# Patient Record
Sex: FEMALE | Race: WHITE | NOT HISPANIC OR LATINO | Employment: OTHER | URBAN - METROPOLITAN AREA
[De-identification: names, ages, dates, MRNs, and addresses within clinical notes are randomized per-mention and may not be internally consistent; named-entity substitution may affect disease eponyms.]

---

## 2017-01-12 ENCOUNTER — TELEPHONE (OUTPATIENT)
Dept: ENDOCRINOLOGY | Facility: CLINIC | Age: 72
End: 2017-01-12

## 2017-01-12 NOTE — TELEPHONE ENCOUNTER
Per Dr. Nelson, the patient was instructed to take both sets on steroids since she has AI and is ill.

## 2017-01-12 NOTE — TELEPHONE ENCOUNTER
----- Message from Lonny Willis sent at 1/11/2017  3:03 PM CST -----  Contact: Patient  Patient called regarding predniSONE (DELTASONE) 5 MG tablet- Wants to know if she should be taking both the Prednisone while also taking methylPREDNISolone (MEDROL DOSEPACK) 4 mg tablet - Please reach her to advise at [phone]

## 2017-02-08 ENCOUNTER — LAB VISIT (OUTPATIENT)
Dept: LAB | Facility: HOSPITAL | Age: 72
End: 2017-02-08
Attending: INTERNAL MEDICINE
Payer: MEDICARE

## 2017-02-08 DIAGNOSIS — R68.89 ABNORMAL ENDOCRINE LABORATORY TEST FINDING: ICD-10-CM

## 2017-02-08 LAB — CORTIS SERPL-MCNC: <1 UG/DL

## 2017-02-08 PROCEDURE — 82533 TOTAL CORTISOL: CPT

## 2017-02-08 PROCEDURE — 82024 ASSAY OF ACTH: CPT

## 2017-02-08 PROCEDURE — 80053 COMPREHEN METABOLIC PANEL: CPT

## 2017-02-08 PROCEDURE — 36415 COLL VENOUS BLD VENIPUNCTURE: CPT | Mod: PO

## 2017-02-09 LAB
ALBUMIN SERPL BCP-MCNC: 3.5 G/DL
ALP SERPL-CCNC: 48 U/L
ALT SERPL W/O P-5'-P-CCNC: 17 U/L
ANION GAP SERPL CALC-SCNC: 5 MMOL/L
AST SERPL-CCNC: 21 U/L
BILIRUB SERPL-MCNC: 0.5 MG/DL
BUN SERPL-MCNC: 17 MG/DL
CALCIUM SERPL-MCNC: 9.2 MG/DL
CHLORIDE SERPL-SCNC: 105 MMOL/L
CO2 SERPL-SCNC: 28 MMOL/L
CREAT SERPL-MCNC: 1 MG/DL
EST. GFR  (AFRICAN AMERICAN): >60 ML/MIN/1.73 M^2
EST. GFR  (NON AFRICAN AMERICAN): 57.2 ML/MIN/1.73 M^2
GLUCOSE SERPL-MCNC: 101 MG/DL
POTASSIUM SERPL-SCNC: 4 MMOL/L
PROT SERPL-MCNC: 7 G/DL
SODIUM SERPL-SCNC: 138 MMOL/L

## 2017-02-10 LAB — ACTH PLAS-MCNC: 11 PG/ML

## 2017-02-13 ENCOUNTER — OFFICE VISIT (OUTPATIENT)
Dept: ENDOCRINOLOGY | Facility: CLINIC | Age: 72
End: 2017-02-13
Payer: MEDICARE

## 2017-02-13 VITALS
HEIGHT: 65 IN | WEIGHT: 124.56 LBS | HEART RATE: 84 BPM | BODY MASS INDEX: 20.75 KG/M2 | DIASTOLIC BLOOD PRESSURE: 72 MMHG | SYSTOLIC BLOOD PRESSURE: 132 MMHG

## 2017-02-13 DIAGNOSIS — E04.2 MULTINODULAR GOITER: ICD-10-CM

## 2017-02-13 DIAGNOSIS — E27.40 ADRENAL INSUFFICIENCY: Primary | ICD-10-CM

## 2017-02-13 PROCEDURE — 99999 PR PBB SHADOW E&M-EST. PATIENT-LVL IV: CPT | Mod: PBBFAC,,, | Performed by: INTERNAL MEDICINE

## 2017-02-13 PROCEDURE — 1126F AMNT PAIN NOTED NONE PRSNT: CPT | Mod: S$GLB,,, | Performed by: INTERNAL MEDICINE

## 2017-02-13 PROCEDURE — 1159F MED LIST DOCD IN RCRD: CPT | Mod: S$GLB,,, | Performed by: INTERNAL MEDICINE

## 2017-02-13 PROCEDURE — 1160F RVW MEDS BY RX/DR IN RCRD: CPT | Mod: S$GLB,,, | Performed by: INTERNAL MEDICINE

## 2017-02-13 PROCEDURE — 99214 OFFICE O/P EST MOD 30 MIN: CPT | Mod: S$GLB,,, | Performed by: INTERNAL MEDICINE

## 2017-02-13 PROCEDURE — 3075F SYST BP GE 130 - 139MM HG: CPT | Mod: S$GLB,,, | Performed by: INTERNAL MEDICINE

## 2017-02-13 PROCEDURE — 1157F ADVNC CARE PLAN IN RCRD: CPT | Mod: S$GLB,,, | Performed by: INTERNAL MEDICINE

## 2017-02-13 PROCEDURE — 3078F DIAST BP <80 MM HG: CPT | Mod: S$GLB,,, | Performed by: INTERNAL MEDICINE

## 2017-02-13 NOTE — MR AVS SNAPSHOT
[facility]  [address] [facility] [address]  [place] [address]  Phone: [phone]  Fax: [phone]                  Telma Fraser   2017 4:00 PM   Office Visit    Description:  Female : 1945   Provider:  Jean Nelson DO   Department:  [place] - Endocrinology           Diagnoses this Visit        Comments    Adrenal insufficiency    -  Primary     Multinodular goiter                To Do List           Future Appointments        Provider Department Dept Phone    2017 10:00 AM LAB, [place] [facility] [phone]    2017 10:15 AM [facility] [phone]    2017 2:30 PM Jean Nelson DO [facility] [phone]      Goals (5 Years of Data)     None      [facility]     [facility] Nurse Care [facility] -  Assistance  Registered nurses in the [facility] provide clinical advisement, health education, appointment booking, and other advisory services.  Call for this free service at [phone].             Medications           Message regarding Medications     Verify the changes and/or additions to your medication regime listed below are the same as discussed with your clinician today.  If any of these changes or additions are incorrect, please notify your healthcare provider.             Verify that the below list of medications is an accurate representation of the medications you are currently taking.  If none reported, the list may be blank. If incorrect, please contact your healthcare provider. Carry this list with you in case of emergency.           Current Medications     albuterol (PROVENTIL) 2.5 mg /3 mL (0.083 %) nebulizer solution Take 3 mLs (2.5 mg total) by nebulization every 6 (six) hours as needed for Wheezing.    albuterol 90 mcg/actuation inhaler Inhale 2 puffs into the lungs every 6 (six) hours as needed for Wheezing or Shortness of Breath.    alprazolam (XANAX) 0.5 MG  tablet     ammonium lactate (LAC-HYDRIN) 12 % lotion     apixaban (ELIQUIS) 5 mg Tab Take 5 mg by mouth 2 (two) times daily.    atorvastatin (LIPITOR) 10 MG tablet     B COMPLEX & C NO.20-FOLIC ACID ORAL Take by mouth.    BIOTIN ORAL Take by mouth.    cholecalciferol, vitamin D3, 2,000 unit Cap Take 1 capsule by mouth once daily. 1,000 units daily    clobetasol (CLOBEX) 0.05 % topical spray Apply topically 2 (two) times daily.    clotrimazole-betamethasone 1-0.05% (LOTRISONE) cream Apply topically 2 (two) times daily.    desloratadine (CLARINEX) 5 mg tablet Take 5 mg by mouth once daily.    fentaNYL (DURAGESIC) 50 mcg/hr Place 1 patch onto the skin every 72 hours.    hydrOXYzine HCl (ATARAX) 10 MG Tab Take 10 mg by mouth 3 (three) times daily as needed.    IMMUN GLOB G,IGG,/PRO/IGA 0-50 (PRIVIGEN IV) Inject 25 mg into the vein every 30 days.    ipratropium (ATROVENT) 0.03 % nasal spray 2 sprays by Nasal route 2 (two) times daily.    iron, carbonyl, (ICAR) 15 mg/1.25 mL Susp Take by mouth.    methylPREDNISolone (MEDROL DOSEPACK) 4 mg tablet use as directed    metoprolol succinate (TOPROL-XL) 25 MG 24 hr tablet Take 25 mg by mouth once daily.    montelukast (SINGULAIR) 10 mg tablet Take 10 mg by mouth every evening.    NITROSTAT 0.4 mg SL tablet     pantoprazole (PROTONIX) 40 MG tablet Take 40 mg by mouth 2 (two) times daily.     predniSONE (DELTASONE) 5 MG tablet 1.5 tablets (7.5 mg) once daily    primidone (MYSOLINE) 50 MG Tab 250 mg. pt takes 200 mg at night    promethazine (PHENERGAN) 25 MG tablet Take 1 tablet (25 mg total) by mouth every 6 (six) hours as needed for Nausea.    propafenone (RYTHMOL) 300 MG tablet Take 300 mg by mouth as needed.    salmeterol (SEREVENT) 50 mcg/dose diskus inhaler Inhale 1 puff into the lungs 2 (two) times daily.      sodium chloride 1 gram tablet Take 1 g by mouth 2 (two) times daily.     temazepam (RESTORIL) 30 mg capsule     tramadol (ULTRAM) 50 mg tablet     TREZIX .5-30  "mg Cap     zonisamide (ZONEGRAN) 100 MG Cap 200 mg every evening.            Clinical Reference Information           Your Vitals Were     BP Pulse Height Weight BMI    132/72 (BP Location: Left arm, Patient Position: Sitting, BP Method: Manual) 84 5' 5" (1.651 m) 56.5 kg (124 lb 9 oz) 20.73 kg/m2      Blood Pressure          Most Recent Value    BP  132/72      Allergies as of 2/13/2017     Fiorinal [Butalbital-aspirin-caffeine]    Gabapentin    Doxycycline    Evista [Raloxifene]    Lyrica [Pregabalin]      Immunizations Administered on Date of Encounter - 2/13/2017     None      Orders Placed During Today's Visit     Future Labs/Procedures Expected by Expires    ACTH  2/13/2017 2/13/2018    Comprehensive metabolic panel  2/13/2017 2/14/2018    TSH  2/13/2017 2/13/2018    US Soft Tissue Head Neck Thyroid  2/13/2017 2/13/2018      Language Assistance Services     ATTENTION: Language assistance services are available, free of charge. Please call [phone].      ATENCIÓN: Si habla abril, tiene a ruggiero disposición servicios gratuitos de asistencia lingüística. Llame al [phone].     CHÚ Ý: N?u b?n nói Ti?ng Vi?t, có các d?ch v? h? tr? ngôn ng? mi?n phí dành cho b?n. G?i s? [phone].         [facility] complies with applicable Federal civil rights laws and does not discriminate on the basis of race, color, national origin, age, disability, or sex.        "

## 2017-02-13 NOTE — PROGRESS NOTES
CHIEF COMPLAINT: Multinodular Goiter.   71 year old being seen as a f/u. She had a benign FNA 3/21/13. 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 She is seeing nephrology. Started on a trial of hydrocortisone for possible adrenal insufficiency (could not R/O based on cosyntropin stim test and having fatigue). Could not tolerate higher dose of steroids. On prednisone 7.5 currently. No vomiting. Not lightheaded when standing. No abd pain.         PAST MEDICAL HISTORY: Asthma, WPW, depression/anxiety, hyperlipidemia.     PAST SURGICAL HISTORY: right knee replacement. Hand surgery.     SOCIAL HISTORY: No T/A     FAMILY HISTORY: No known thyroid disease or thyroid cancer. Dementia.     MEDICATIONS/ALLERGIES: The patient's MedCard has been updated and reviewed.     ROS:   Constitutional: weight increased.   ENT: No dysphagia    Cardiovascular: No CP  Respiratory: SOB at baseline   Gastrointestinal: Seeing GI for nausea and difficulty swallowing.   GenitoUrinary - No dysuria   Skin: No new skin rash   Neurologic: No focal neurologic complaints   Remainder ROS negative         PE:   GENERAL: Well developed, well nourished.   PSYCH: appropriate mood and affect   EYES: PERRL, EOM intact.   ENT: Nares patent, oropharynx clear, mucosa pink,   NECK: Supple, trachea midline, Left nodule palpable. No LAD   CHEST: Resp even and unlabored, CTA bilateral.   CARDIAC: RRR, S1, S2 heard, no murmurs, rubs, S3, or S4     Results for DAVIDE VALDES (MRN [id number]) as of 2/13/2017 15:54   Ref. Range 2/8/2017 07:59   Sodium Latest Ref Range: 136 - 145 mmol/L 138   Potassium Latest Ref Range: 3.5 - 5.1 mmol/L 4.0   Chloride Latest Ref Range: 95 - 110 mmol/L 105   CO2 Latest Ref Range: 23 - 29 mmol/L 28   Anion Gap Latest Ref Range: 8 - 16 mmol/L 5 (L)   BUN, Bld Latest Ref Range: 8 - 23 mg/dL 17   Creatinine Latest Ref Range: 0.5 - 1.4 mg/dL 1.0   eGFR if non African American Latest Ref Range: >60 mL/min/1.73 m^2 57.2 (A)   eGFR if African  American Latest Ref Range: >60 mL/min/1.73 m^2 >60.0   Glucose Latest Ref Range: 70 - 110 mg/dL 101   Calcium Latest Ref Range: 8.7 - 10.5 mg/dL 9.2   Alkaline Phosphatase Latest Ref Range: 55 - 135 U/L 48 (L)   Total Protein Latest Ref Range: 6.0 - 8.4 g/dL 7.0   Albumin Latest Ref Range: 3.5 - 5.2 g/dL 3.5   Total Bilirubin Latest Ref Range: 0.1 - 1.0 mg/dL 0.5   AST Latest Ref Range: 10 - 40 U/L 21   ALT Latest Ref Range: 10 - 44 U/L 17         ASSESSMENT/PLAN:   1. Adrenal Insuffiency- Labs borderline for AI. Since having some symptoms that could be AI related decided to do a trial of steroids. Repeat testing could not rule out AI. Will continue steroid therapy.     2. Multinodular goiter-S/P benign FNA of left nodule.     3. SIADH- Na stable. Being followed by nephrology    FOLLOWUP  F/U 6 months with CMP, ACTH, Thyroid US

## 2017-02-20 ENCOUNTER — TELEPHONE (OUTPATIENT)
Dept: ENDOCRINOLOGY | Facility: CLINIC | Age: 72
End: 2017-02-20

## 2017-02-20 NOTE — TELEPHONE ENCOUNTER
The patient is having hand surgery anywhere between 3/7 and 3/21. She stated you need to send in a rx for steroids to cover her prior to surgery. Please advise

## 2017-03-02 ENCOUNTER — TELEPHONE (OUTPATIENT)
Dept: ENDOCRINOLOGY | Facility: CLINIC | Age: 72
End: 2017-03-02

## 2017-03-02 NOTE — TELEPHONE ENCOUNTER
The patient is having hand surgery under an anesthetic block. Per Dr. Nelson, she is to triple her oral dose of steroids the day before, the day of the surgery, and the day after the surgery.

## 2017-04-03 NOTE — TELEPHONE ENCOUNTER
----- Message from Nahun Jourdan sent at 4/3/2017 12:40 PM CDT -----  Contact: Patient  Patient states that she needs a refill for the medication PredniSONE (DELTASONE) 7 MG tablets the 90 day supply   Any questions, please call [phone] or cell [phone].  Thank you        [facility] - [place], [place] - [address]  [address]  [facility] [address]  Phone: [phone] Fax: [phone]

## 2017-04-05 RX ORDER — PREDNISONE 5 MG/1
TABLET ORAL
Qty: 135 TABLET | Refills: 3 | Status: SHIPPED | OUTPATIENT
Start: 2017-04-05 | End: 2017-07-28

## 2017-07-21 ENCOUNTER — HOSPITAL ENCOUNTER (OUTPATIENT)
Dept: RADIOLOGY | Facility: HOSPITAL | Age: 72
Discharge: HOME | End: 2017-07-21
Attending: INTERNAL MEDICINE
Payer: MEDICARE

## 2017-07-21 DIAGNOSIS — E27.40 ADRENAL INSUFFICIENCY: ICD-10-CM

## 2017-07-21 DIAGNOSIS — E04.2 MULTINODULAR GOITER: ICD-10-CM

## 2017-07-21 PROCEDURE — 76536 US EXAM OF HEAD AND NECK: CPT | Mod: 26,,, | Performed by: RADIOLOGY

## 2017-07-21 PROCEDURE — 76536 US EXAM OF HEAD AND NECK: CPT | Mod: TC,PO

## 2017-07-28 ENCOUNTER — OFFICE VISIT (OUTPATIENT)
Dept: ENDOCRINOLOGY | Facility: CLINIC | Age: 72
End: 2017-07-28
Payer: MEDICARE

## 2017-07-28 VITALS
WEIGHT: 129 LBS | DIASTOLIC BLOOD PRESSURE: 68 MMHG | BODY MASS INDEX: 21.49 KG/M2 | HEART RATE: 65 BPM | SYSTOLIC BLOOD PRESSURE: 104 MMHG | HEIGHT: 65 IN

## 2017-07-28 DIAGNOSIS — E22.2 SIADH (SYNDROME OF INAPPROPRIATE ADH PRODUCTION): ICD-10-CM

## 2017-07-28 DIAGNOSIS — E27.40 ADRENAL INSUFFICIENCY: Primary | ICD-10-CM

## 2017-07-28 DIAGNOSIS — E04.2 MULTINODULAR GOITER: ICD-10-CM

## 2017-07-28 PROCEDURE — 99999 PR PBB SHADOW E&M-EST. PATIENT-LVL II: CPT | Mod: PBBFAC,,, | Performed by: INTERNAL MEDICINE

## 2017-07-28 PROCEDURE — 99214 OFFICE O/P EST MOD 30 MIN: CPT | Mod: S$GLB,,, | Performed by: INTERNAL MEDICINE

## 2017-07-28 PROCEDURE — 1159F MED LIST DOCD IN RCRD: CPT | Mod: S$GLB,,, | Performed by: INTERNAL MEDICINE

## 2017-07-28 RX ORDER — PREDNISONE 5 MG/1
5 TABLET ORAL DAILY
Qty: 30 TABLET | Refills: 3
Start: 2017-07-28 | End: 2017-08-07

## 2017-07-28 RX ORDER — VIT C/E/ZN/COPPR/LUTEIN/ZEAXAN 250MG-90MG
1000 CAPSULE ORAL DAILY
COMMUNITY

## 2017-07-28 RX ORDER — PROPAFENONE HYDROCHLORIDE 150 MG/1
TABLET, COATED ORAL 2 TIMES DAILY
COMMUNITY
Start: 2017-07-20 | End: 2022-11-10 | Stop reason: CLARIF

## 2017-07-28 NOTE — PROGRESS NOTES
CHIEF COMPLAINT: Multinodular Goiter.   71 year old being seen as a f/u. She had a benign FNA 3/21/13. 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 She is seeing nephrology. Started on a trial of hydrocortisone for possible adrenal insufficiency (could not R/O based on cosyntropin stim test and having fatigue). Could not tolerate higher dose of steroids. On prednisone 7.5 currently. No N/V. Recently admitted for afib. Not lightheaded when standing. Would like to decrease to prednisone 5 mg.         PAST MEDICAL HISTORY: Asthma, WPW, depression/anxiety, hyperlipidemia.     PAST SURGICAL HISTORY: right knee replacement. Hand surgery.     SOCIAL HISTORY: No T/A     FAMILY HISTORY: No known thyroid disease or thyroid cancer. Dementia.     MEDICATIONS/ALLERGIES: The patient's MedCard has been updated and reviewed.     ROS:   Constitutional: weight increased.   ENT: No dysphagia    Cardiovascular: No CP  Respiratory: SOB at baseline   Gastrointestinal: Seeing GI for nausea and difficulty swallowing.   GenitoUrinary - No dysuria   Skin: No new skin rash   Neurologic: No focal neurologic complaints   Remainder ROS negative         PE:   GENERAL: Well developed, well nourished.   PSYCH: appropriate mood and affect   EYES: PERRL, EOM intact.   ENT: Nares patent, oropharynx clear, mucosa pink,   NECK: Supple, trachea midline, Left nodule palpable. No LAD   CHEST: Resp even and unlabored, CTA bilateral.   CARDIAC: RRR, S1, S2 heard, no murmurs, rubs, S3, or S4       Results for DAVIDE VALDES (MRN [id number]) as of 7/28/2017 14:23   Ref. Range 7/21/2017 09:45   Sodium Latest Ref Range: 136 - 145 mmol/L 138   Potassium Latest Ref Range: 3.5 - 5.1 mmol/L 3.7   Chloride Latest Ref Range: 95 - 110 mmol/L 108   CO2 Latest Ref Range: 23 - 29 mmol/L 22 (L)   Anion Gap Latest Ref Range: 8 - 16 mmol/L 8   BUN, Bld Latest Ref Range: 8 - 23 mg/dL 17   Creatinine Latest Ref Range: 0.5 - 1.4 mg/dL 1.1   eGFR if non  Latest  Ref Range: >60 mL/min/1.73 m^2 51.0 (A)   eGFR if African American Latest Ref Range: >60 mL/min/1.73 m^2 58.8 (A)   Glucose Latest Ref Range: 70 - 110 mg/dL 113 (H)   Calcium Latest Ref Range: 8.7 - 10.5 mg/dL 8.7   Alkaline Phosphatase Latest Ref Range: 55 - 135 U/L 48 (L)   Total Protein Latest Ref Range: 6.0 - 8.4 g/dL 6.9   Albumin Latest Ref Range: 3.5 - 5.2 g/dL 3.3 (L)   Total Bilirubin Latest Ref Range: 0.1 - 1.0 mg/dL 0.3   AST Latest Ref Range: 10 - 40 U/L 30   ALT Latest Ref Range: 10 - 44 U/L 21   ACTH Latest Ref Range: 0 - 46 pg/mL <10   TSH Latest Ref Range: 0.400 - 4.000 uIU/mL 2.332       THYROID US:  Echo thyroidwith comparison to 5//16    The right lobe of thyroid gland measures 3.9 x 1.2 x 1.5 cm in size.  The left lobe the thyroid gland measures 4.2 x 1.6 x 1.5cm in size.  This gives an approximate volume of on the right of 3.7cc and an approximate volume on the left of 4.5 cc for a total approximate volume of 9.1 cc.    Multinodular goiter is noted    A solid isthmus nodule is noted on the right of 1.2 cm without obvious microcalcifications without significant detrimental change since the prior when measured in a similar manner     A cluster of nodules is noted at the right lower pole the thyroid gland also unchanged. A hyperechoic 7 mm nodule is noted unchanged    Within the left lobe of the thyroid gland several larger nodules are noted the largest demonstrating coarse calcification internally of 1.6 cm predominantly solid with a possible capsule unchanged since the prior    In the lower pole left lobe of the thyroid gland a nodule is noted of 8 mm that is hypoechoic possibly with microcalcifications unchanged. More superiorly 1.1 cm nodule is noted also unchanged predominantly solid.   Impression         1.Multinodular goiter without convincing detrimental change since 5/4/16            ASSESSMENT/PLAN:   1. Adrenal Insuffiency- Labs borderline for AI. Since having some symptoms that could be  AI related decided to do a trial of steroids. Repeat testing could not rule out AI and symptoms improved. Prednisone 5 mg.     2. Multinodular goiter-S/P benign FNA of left nodule. US stable. Continue current tx.     3. SIADH- Na stable. Being followed by nephrology    FOLLOWUP  F/U 6 months with CMP, ACTH,

## 2017-11-28 ENCOUNTER — TELEPHONE (OUTPATIENT)
Dept: HEMATOLOGY/ONCOLOGY | Facility: CLINIC | Age: 72
End: 2017-11-28

## 2017-12-13 ENCOUNTER — OFFICE VISIT (OUTPATIENT)
Dept: HEMATOLOGY/ONCOLOGY | Facility: CLINIC | Age: 72
End: 2017-12-13
Payer: MEDICARE

## 2017-12-13 VITALS
HEIGHT: 65 IN | TEMPERATURE: 98 F | WEIGHT: 133.31 LBS | SYSTOLIC BLOOD PRESSURE: 117 MMHG | DIASTOLIC BLOOD PRESSURE: 84 MMHG | HEART RATE: 59 BPM | BODY MASS INDEX: 22.21 KG/M2 | RESPIRATION RATE: 18 BRPM

## 2017-12-13 DIAGNOSIS — Z87.19 H/O: GI BLEED: ICD-10-CM

## 2017-12-13 DIAGNOSIS — D63.8 ANEMIA, CHRONIC DISEASE: ICD-10-CM

## 2017-12-13 DIAGNOSIS — D63.1 ANEMIA OF CHRONIC RENAL FAILURE, UNSPECIFIED CKD STAGE: ICD-10-CM

## 2017-12-13 DIAGNOSIS — E55.9 VITAMIN D DEFICIENCY: ICD-10-CM

## 2017-12-13 DIAGNOSIS — D50.0 ANEMIA DUE TO CHRONIC BLOOD LOSS: ICD-10-CM

## 2017-12-13 DIAGNOSIS — N18.9 ANEMIA OF CHRONIC RENAL FAILURE, UNSPECIFIED CKD STAGE: ICD-10-CM

## 2017-12-13 DIAGNOSIS — D50.9 IRON DEFICIENCY ANEMIA, UNSPECIFIED IRON DEFICIENCY ANEMIA TYPE: Primary | ICD-10-CM

## 2017-12-13 PROCEDURE — 99213 OFFICE O/P EST LOW 20 MIN: CPT | Mod: ,,, | Performed by: INTERNAL MEDICINE

## 2017-12-13 RX ORDER — METOCLOPRAMIDE 10 MG/1
TABLET ORAL
COMMUNITY
Start: 2017-09-19 | End: 2018-09-29

## 2017-12-13 RX ORDER — AZELASTINE HYDROCHLORIDE 0.5 MG/ML
SOLUTION/ DROPS OPHTHALMIC
COMMUNITY
Start: 2017-11-02 | End: 2018-08-27 | Stop reason: ALTCHOICE

## 2017-12-13 RX ORDER — NEOMYCIN SULFATE, POLYMYXIN B SULFATE AND DEXAMETHASONE 3.5; 10000; 1 MG/ML; [USP'U]/ML; MG/ML
SUSPENSION/ DROPS OPHTHALMIC
COMMUNITY
Start: 2017-10-20 | End: 2018-08-27 | Stop reason: ALTCHOICE

## 2017-12-13 RX ORDER — KETOROLAC TROMETHAMINE 5 MG/ML
SOLUTION OPHTHALMIC
COMMUNITY
Start: 2017-11-29 | End: 2018-08-27 | Stop reason: ALTCHOICE

## 2017-12-13 RX ORDER — PREDNISONE 5 MG/1
7.5 TABLET ORAL DAILY
COMMUNITY
Start: 2017-11-15 | End: 2018-01-17 | Stop reason: SDUPTHER

## 2017-12-13 RX ORDER — CIPROFLOXACIN 500 MG/1
TABLET ORAL
COMMUNITY
Start: 2017-10-20 | End: 2018-02-26

## 2017-12-13 NOTE — LETTER
December 13, 2017      ANNIE Kelley Jr., MD  [address]  [address]  [facility] [address]           [facility]  [address]  [address]  [facility] [address]  Phone: [phone]  Fax: [phone]          Patient: Telma Fraser   MR Number: [id number]   YOB: 1945   Date of Visit: 12/13/2017       Dear Dr. ANNIE Kelley Jr.:    Thank you for referring Telma Fraser to me for evaluation. Attached you will find relevant portions of my assessment and plan of care.    If you have questions, please do not hesitate to call me. I look forward to following Telma Fraser along with you.    Sincerely,    Vince Cha MD    Enclosure  CC:  No Recipients    If you would like to receive this communication electronically, please contact [email or web address] or [phone] to request more information on [employer] Link access.    For providers and/or their staff who would like to refer a patient to [facility], please contact us through our one-stop-shop provider referral line, [facility], at [phone].    If you feel you have received this communication in error or would no longer like to receive these types of communications, please e-mail [email or web address]

## 2017-12-13 NOTE — PROGRESS NOTES
[facility]ematology/On[facility]  PROGRESS NOTE      Subjective:       Patient ID:   NAME: Telma Fraser : 1945     72 y.o. female    Referring Doc: Filemon Kelley  Other Physicians: Mitchell Emanuel, Maximino Maravilla    Chief Complaint:  Anemia f/u    History of Present Illness:     Patient returns today for a regularly scheduled follow-up visit.  The patient is doing ok with no new issues. She denies any CP, SOB, HA's or N/V.             ROS:   GEN: normal without any fever, night sweats or weight loss  HEENT: normal with no HA's, sore throat, stiff neck, changes in vision  CV: normal with no CP, SOB, PND, FLORES or orthopnea  PULM: normal with no SOB, cough, hemoptysis, sputum or pleuritic pain  GI: normal with no abdominal pain, nausea, vomiting, constipation, diarrhea, melanotic stools, BRBPR, or hematemesis  : normal with no hematuria, dysuria  BREAST: normal with no mass, discharge, pain  SKIN: normal with no rash, erythema, bruising, or swelling    Allergies:  Review of patient's allergies indicates:   Allergen Reactions    Fiorinal [butalbital-aspirin-caffeine] Hives    Gabapentin Other (See Comments)     shakes    Doxycycline Nausea And Vomiting     Tore up her stomach    Evista [raloxifene] Other (See Comments)     Shortness of breath    Lyrica [pregabalin] Other (See Comments)     Shortness of breath       Medications:    Current Outpatient Prescriptions:     acyclovir (ZOVIRAX) 800 MG Tab, Take 1 tablet (800 mg total) by mouth daily as needed., Disp: 90 tablet, Rfl: 3    albuterol (PROVENTIL) 2.5 mg /3 mL (0.083 %) nebulizer solution, Take 3 mLs (2.5 mg total) by nebulization every 6 (six) hours as needed for Wheezing., Disp: 120 each, Rfl: 1    albuterol 90 mcg/actuation inhaler, Inhale 2 puffs into the lungs every 6 (six) hours as needed for Wheezing or Shortness of Breath., Disp: 1 Inhaler, Rfl: 11    alprazolam (XANAX) 0.5 MG tablet, Take 0.5 mg by mouth 2 (two) times daily as needed. ,  Disp: , Rfl:     ammonium lactate (LAC-HYDRIN) 12 % lotion, Apply 1 application topically 2 (two) times daily as needed. , Disp: , Rfl:     apixaban (ELIQUIS) 5 mg Tab, Take 5 mg by mouth 2 (two) times daily., Disp: , Rfl:     atorvastatin (LIPITOR) 10 MG tablet, Take 10 mg by mouth once daily. , Disp: , Rfl:     azelastine (OPTIVAR) 0.05 % ophthalmic solution, , Disp: , Rfl:     BIOTIN ORAL, Take 1 tablet by mouth once daily. , Disp: , Rfl:     cholecalciferol, vitamin D3, (VITAMIN D3) 1,000 unit capsule, Take 1,000 Units by mouth once daily., Disp: , Rfl:     ciprofloxacin HCl (CIPRO) 500 MG tablet, , Disp: , Rfl:     clobetasol (CLOBEX) 0.05 % topical spray, Apply topically 2 (two) times daily., Disp: , Rfl:     clotrimazole-betamethasone 1-0.05% (LOTRISONE) cream, Apply topically 2 (two) times daily., Disp: , Rfl:     desloratadine (CLARINEX) 5 mg tablet, Take 5 mg by mouth once daily., Disp: , Rfl:     fentaNYL (DURAGESIC) 50 mcg/hr, Place 1 patch onto the skin every 72 hours., Disp: , Rfl:     FLOVENT  mcg/actuation inhaler, Inhale 2 puffs into the lungs once daily. , Disp: , Rfl:     hydrOXYzine HCl (ATARAX) 10 MG Tab, Take 10 mg by mouth 3 (three) times daily as needed., Disp: , Rfl:     IMMUN GLOB G,IGG,/PRO/IGA 0-50 (PRIVIGEN IV), Inject 25 mg into the vein every 30 days., Disp: , Rfl:     IRON FUM/VIT C/ASCORBATE SOD (IRON PLUS VITAMIN C ORAL), Take 1 tablet by mouth every other day., Disp: , Rfl:     ketorolac 0.5% (ACULAR) 0.5 % Drop, , Disp: , Rfl:     metoclopramide HCl (REGLAN) 10 MG tablet, , Disp: , Rfl:     metoprolol succinate (TOPROL-XL) 25 MG 24 hr tablet, Take 12.5 mg by mouth 2 (two) times daily. , Disp: , Rfl:     montelukast (SINGULAIR) 10 mg tablet, Take 10 mg by mouth every evening., Disp: , Rfl:     nebulizer accessories Misc, Nebulizer tubing, Disp: 1 each, Rfl: 0    neomycin-polymyxin-dexamethasone (MAXITROL) 3.5mg/mL-10,000 unit/mL-0.1 % DrpS, , Disp: ,  "Rfl:     NITROSTAT 0.4 mg SL tablet, Place 0.4 mg under the tongue every 5 (five) minutes as needed. , Disp: , Rfl:     pantoprazole (PROTONIX) 40 MG tablet, Take 40 mg by mouth 2 (two) times daily. , Disp: , Rfl:     predniSONE (DELTASONE) 5 MG tablet, , Disp: , Rfl:     primidone (MYSOLINE) 250 MG Tab, Take 250 mg by mouth nightly. , Disp: , Rfl:     promethazine (PHENERGAN) 25 MG tablet, Take 1 tablet (25 mg total) by mouth every 6 (six) hours as needed for Nausea., Disp: 10 tablet, Rfl: 0    propafenone (RHTHYMOL) 150 MG Tab, Take 225 mg by mouth 2 (two) times daily., Disp: , Rfl:     salmeterol (SEREVENT) 50 mcg/dose diskus inhaler, Inhale 1 puff into the lungs 2 (two) times daily.  , Disp: , Rfl:     sodium chloride 1 gram tablet, Take 1 g by mouth 2 (two) times daily. , Disp: , Rfl:     temazepam (RESTORIL) 30 mg capsule, Take 30 mg by mouth nightly as needed. , Disp: , Rfl:     tramadol (ULTRAM) 50 mg tablet, , Disp: , Rfl:     TREZIX .5-30 mg Cap, , Disp: , Rfl:     UNABLE TO FIND, Take 1 tablet by mouth once daily. Tri B Vitamin (B6-Folic Acid-B12), Disp: , Rfl:     zonisamide (ZONEGRAN) 100 MG Cap, 200 mg every evening. , Disp: , Rfl:     PMHx/PSHx Updates:  See patient's last visit with me on 5/10/17  See H&P on 9/10/2007        Pathology:  No matching staging information was found for the patient.          Objective:     Vitals:  Blood pressure 117/84, pulse (!) 59, temperature 98.3 °F (36.8 °C), resp. rate 18, height 5' 5" (1.651 m), weight 60.5 kg (133 lb 4.8 oz).    Physical Examination:   GEN: no apparent distress, comfortable; AAOx3  HEAD: atraumatic and normocephalic  EYES: no pallor, no icterus, PERRLA  ENT: OMM, no pharyngeal erythema, external ears WNL; no nasal discharge; no thrush  NECK: no masses, thyroid normal, trachea midline, no LAD/LN's, supple  CV: RRR with no murmur; normal pulse; normal S1 and S2; no pedal edema  CHEST: Normal respiratory effort; CTAB; normal " breath sounds; no wheeze or crackles  ABDOM: nontender and nondistended; soft; normal bowel sounds; no rebound/guarding  MUSC/Skeletal: ROM normal; no crepitus; joints normal; no deformities or arthropathy  EXTREM: no clubbing, cyanosis, inflammation or swelling  SKIN: no rashes, lesions, ulcers, petechiae or subcutaneous nodules  : no gilbert  NEURO: grossly intact; motor/sensory WNL; AAOx3; no tremors  PSYCH: normal mood, affect and behavior  LYMPH: normal cervical, supraclavicular, axillary and groin LN's            Labs:     12/1/2017  Lab Results   Component Value Date    WBC 6.91 12/01/2017    HGB 13.6 12/01/2017    HCT 42.2 12/01/2017     (H) 12/01/2017     12/01/2017     BMP  Lab Results   Component Value Date     12/01/2017    K 4.0 12/01/2017     12/01/2017    CO2 27 12/01/2017    BUN 14 12/01/2017    CREATININE 1.10 12/01/2017    CALCIUM 9.4 12/01/2017    ANIONGAP 8 12/01/2017    ESTGFRAFRICA 58 (A) 12/01/2017    EGFRNONAA 51 (A) 12/01/2017     Lab Results   Component Value Date    ALT 26 12/01/2017    AST 30 12/01/2017    ALKPHOS 66 12/01/2017    BILITOT 0.5 12/01/2017     Lab Results   Component Value Date    IRON 83 05/01/2017    TIBC 231 (L) 05/01/2017    FERRITIN 88 12/01/2017     Lab Results   Component Value Date    [id number] >1000 (H) 12/01/2017       Lab Results   Component Value Date    FOLATE >20.0 12/01/2017         Radiology/Diagnostic Studies:    No results found.    I have reviewed all available lab results and radiology reports.    Assessment/Plan:   (1) 72 y.o. female with diagnosis of chronic anemia with multifactorial etiology  - underlying chronic GI bleed due to gastric ulcers in past  - anemia of chronic disorders and chronic renal  - followed by Dr Medrano with GI  - latest hgb good and iron good    (2) atrial fib/WPW syndrome - followed by Dr Stanton/Kinjal with cardiology  - may need another ablation in future  - on eliquis    (3) Prior IV IgG with   aDina in past    (4) Prior TIA with underlying MTHFR-C heterozygosity  - followed by Dr Maravilla in past  - on folbic supplementation in past        PLAN:  1. F/u with PCP, Card, GI etc  2. Check repeat labs in 6 months  3. RTC in 6 months  Fax note to ANNIE Kelley Jr., MD, Maximino Medrano    Discussion:     I have explained all of the above in detail and the patient understands all of the current recommendation(s). I have answered all of their questions to the best of my ability and to their complete satisfaction.   The patient is to continue with the current management plan.            Electronically signed by Vince Cha MD

## 2017-12-21 ENCOUNTER — LAB VISIT (OUTPATIENT)
Dept: LAB | Facility: HOSPITAL | Age: 72
End: 2017-12-21
Attending: INTERNAL MEDICINE
Payer: MEDICARE

## 2017-12-21 DIAGNOSIS — E04.2 MULTINODULAR GOITER: ICD-10-CM

## 2017-12-21 DIAGNOSIS — E22.2 SIADH (SYNDROME OF INAPPROPRIATE ADH PRODUCTION): ICD-10-CM

## 2017-12-21 DIAGNOSIS — E27.40 ADRENAL INSUFFICIENCY: ICD-10-CM

## 2017-12-21 LAB
ALBUMIN SERPL BCP-MCNC: 3.4 G/DL
ALP SERPL-CCNC: 61 U/L
ALT SERPL W/O P-5'-P-CCNC: 14 U/L
ANION GAP SERPL CALC-SCNC: 6 MMOL/L
AST SERPL-CCNC: 26 U/L
BILIRUB SERPL-MCNC: 0.4 MG/DL
BUN SERPL-MCNC: 12 MG/DL
CALCIUM SERPL-MCNC: 9.3 MG/DL
CHLORIDE SERPL-SCNC: 105 MMOL/L
CO2 SERPL-SCNC: 28 MMOL/L
CREAT SERPL-MCNC: 1.1 MG/DL
EST. GFR  (AFRICAN AMERICAN): 58.4 ML/MIN/1.73 M^2
EST. GFR  (NON AFRICAN AMERICAN): 50.6 ML/MIN/1.73 M^2
GLUCOSE SERPL-MCNC: 87 MG/DL
POTASSIUM SERPL-SCNC: 4.8 MMOL/L
PROT SERPL-MCNC: 7.1 G/DL
SODIUM SERPL-SCNC: 139 MMOL/L

## 2017-12-21 PROCEDURE — 82024 ASSAY OF ACTH: CPT

## 2017-12-21 PROCEDURE — 36415 COLL VENOUS BLD VENIPUNCTURE: CPT | Mod: PO

## 2017-12-21 PROCEDURE — 80053 COMPREHEN METABOLIC PANEL: CPT

## 2017-12-22 LAB — ACTH PLAS-MCNC: <10 PG/ML

## 2018-01-03 ENCOUNTER — OFFICE VISIT (OUTPATIENT)
Dept: ENDOCRINOLOGY | Facility: CLINIC | Age: 73
End: 2018-01-03
Payer: MEDICARE

## 2018-01-03 VITALS
HEART RATE: 61 BPM | HEIGHT: 65 IN | DIASTOLIC BLOOD PRESSURE: 70 MMHG | BODY MASS INDEX: 22.24 KG/M2 | WEIGHT: 133.5 LBS | SYSTOLIC BLOOD PRESSURE: 112 MMHG

## 2018-01-03 DIAGNOSIS — E27.40 ADRENAL INSUFFICIENCY: Primary | ICD-10-CM

## 2018-01-03 DIAGNOSIS — E22.2 SIADH (SYNDROME OF INAPPROPRIATE ADH PRODUCTION): ICD-10-CM

## 2018-01-03 DIAGNOSIS — E04.2 MULTINODULAR GOITER: ICD-10-CM

## 2018-01-03 PROCEDURE — 99214 OFFICE O/P EST MOD 30 MIN: CPT | Mod: S$GLB,,, | Performed by: INTERNAL MEDICINE

## 2018-01-03 PROCEDURE — 99999 PR PBB SHADOW E&M-EST. PATIENT-LVL V: CPT | Mod: PBBFAC,,, | Performed by: INTERNAL MEDICINE

## 2018-01-03 NOTE — PROGRESS NOTES
CHIEF COMPLAINT: Multinodular Goiter.   72 year old being seen as a f/u. She had a benign FNA 3/21/13. 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 She is seeing nephrology. Started on a trial of hydrocortisone for possible adrenal insufficiency (could not R/O based on cosyntropin stim test and having fatigue). On prednisone 7.5 currently. Overall feeling well. No N/V          PAST MEDICAL HISTORY: Asthma, WPW, depression/anxiety, hyperlipidemia.     PAST SURGICAL HISTORY: right knee replacement. Hand surgery.     SOCIAL HISTORY: No T/A     FAMILY HISTORY: No known thyroid disease or thyroid cancer. Dementia.     MEDICATIONS/ALLERGIES: The patient's MedCard has been updated and reviewed.     ROS:   Constitutional: weight increased.   ENT: No dysphagia    Cardiovascular: No CP  Respiratory: SOB at baseline   Gastrointestinal: Seeing GI for nausea and difficulty swallowing.   GenitoUrinary - No dysuria   Skin: No new skin rash   Neurologic: No focal neurologic complaints   Remainder ROS negative         PE:   GENERAL: Well developed, well nourished.   PSYCH: appropriate mood and affect   EYES: PERRL, EOM intact.   ENT: Nares patent, oropharynx clear, mucosa pink,   NECK: Supple, trachea midline, Left nodule palpable. No LAD   CHEST: Resp even and unlabored, CTA bilateral.   CARDIAC: RRR, S1, S2 heard, no murmurs, rubs, S3, or S4     Results for DAVIDE VALDES (MRN [id number]) as of 1/3/2018 14:45   Ref. Range 12/21/2017 09:05   Sodium Latest Ref Range: 136 - 145 mmol/L 139   Potassium Latest Ref Range: 3.5 - 5.1 mmol/L 4.8   Chloride Latest Ref Range: 95 - 110 mmol/L 105   CO2 Latest Ref Range: 23 - 29 mmol/L 28   Anion Gap Latest Ref Range: 8 - 16 mmol/L 6 (L)   BUN, Bld Latest Ref Range: 8 - 23 mg/dL 12   Creatinine Latest Ref Range: 0.5 - 1.4 mg/dL 1.1   eGFR if non African American Latest Ref Range: >60 mL/min/1.73 m^2 50.6 (A)   eGFR if African American Latest Ref Range: >60 mL/min/1.73 m^2 58.4 (A)   Glucose  Latest Ref Range: 70 - 110 mg/dL 87   Calcium Latest Ref Range: 8.7 - 10.5 mg/dL 9.3   Alkaline Phosphatase Latest Ref Range: 55 - 135 U/L 61   Total Protein Latest Ref Range: 6.0 - 8.4 g/dL 7.1   Albumin Latest Ref Range: 3.5 - 5.2 g/dL 3.4 (L)   Total Bilirubin Latest Ref Range: 0.1 - 1.0 mg/dL 0.4   AST Latest Ref Range: 10 - 40 U/L 26   ALT Latest Ref Range: 10 - 44 U/L 14   ACTH Latest Ref Range: 0 - 46 pg/mL <10           ASSESSMENT/PLAN:   1. Adrenal Insuffiency- Labs borderline for AI. Since having some symptoms that could be AI related decided to do a trial of steroids. Repeat testing could not rule out AI and symptoms improved. Continue current dose of steroids.     2. Multinodular goiter-S/P benign FNA of left nodule. US stable. Continue current tx.     3. SIADH- Na stable. Being followed by nephrology    FOLLOWUP  F/U 1 year with CMP, ACTH, TSH, Thyroid US

## 2018-01-17 RX ORDER — PREDNISONE 5 MG/1
TABLET ORAL
Qty: 135 TABLET | Refills: 3 | Status: SHIPPED | OUTPATIENT
Start: 2018-01-17 | End: 2018-08-31 | Stop reason: DRUGHIGH

## 2018-06-11 ENCOUNTER — TELEPHONE (OUTPATIENT)
Dept: HEMATOLOGY/ONCOLOGY | Facility: CLINIC | Age: 73
End: 2018-06-11

## 2018-06-11 DIAGNOSIS — N18.9 ANEMIA OF CHRONIC RENAL FAILURE, UNSPECIFIED CKD STAGE: ICD-10-CM

## 2018-06-11 DIAGNOSIS — E55.9 VITAMIN D DEFICIENCY: ICD-10-CM

## 2018-06-11 DIAGNOSIS — D63.1 ANEMIA OF CHRONIC RENAL FAILURE, UNSPECIFIED CKD STAGE: ICD-10-CM

## 2018-06-11 DIAGNOSIS — D50.9 IRON DEFICIENCY ANEMIA, UNSPECIFIED IRON DEFICIENCY ANEMIA TYPE: Primary | ICD-10-CM

## 2018-07-22 PROBLEM — N39.0 RECURRENT UTI: Status: ACTIVE | Noted: 2018-07-22

## 2018-08-28 ENCOUNTER — TELEPHONE (OUTPATIENT)
Dept: HEMATOLOGY/ONCOLOGY | Facility: CLINIC | Age: 73
End: 2018-08-28

## 2018-09-17 ENCOUNTER — TELEPHONE (OUTPATIENT)
Dept: ENDOCRINOLOGY | Facility: CLINIC | Age: 73
End: 2018-09-17

## 2018-09-17 NOTE — TELEPHONE ENCOUNTER
----- Message from Daina Almanzar sent at 9/17/2018 12:37 PM CDT -----  Contact: pt  Pt calling states that she got a letter to call and make an appointment for the Dr in Jan,nothing is coming up in the system so the pt is needing to make this appointment,please...[phone] (home)

## 2018-09-29 PROBLEM — E87.1 HYPONATREMIA: Status: ACTIVE | Noted: 2018-09-29

## 2018-09-29 PROBLEM — S32.592A CLOSED FRACTURE OF LEFT INFERIOR PUBIC RAMUS: Status: ACTIVE | Noted: 2018-09-29

## 2018-09-29 PROBLEM — S32.82XA: Status: ACTIVE | Noted: 2018-09-29

## 2018-09-29 PROBLEM — I48.20 ATRIAL FIBRILLATION, CHRONIC: Status: ACTIVE | Noted: 2018-09-29

## 2018-09-29 PROBLEM — I95.9 HYPOTENSION: Status: ACTIVE | Noted: 2018-09-29

## 2018-10-01 PROBLEM — E27.2 ACUTE ADRENAL CRISIS: Status: ACTIVE | Noted: 2018-10-01

## 2018-10-02 PROBLEM — S32.82XA: Status: RESOLVED | Noted: 2018-09-29 | Resolved: 2018-10-02

## 2018-10-02 PROBLEM — E87.1 HYPONATREMIA: Status: RESOLVED | Noted: 2018-09-29 | Resolved: 2018-10-02

## 2018-10-02 PROBLEM — E27.2 ACUTE ADRENAL CRISIS: Status: RESOLVED | Noted: 2018-10-01 | Resolved: 2018-10-02

## 2018-10-02 PROBLEM — I48.20 ATRIAL FIBRILLATION, CHRONIC: Status: RESOLVED | Noted: 2018-09-29 | Resolved: 2018-10-02

## 2018-10-02 PROBLEM — I95.9 HYPOTENSION: Status: RESOLVED | Noted: 2018-09-29 | Resolved: 2018-10-02

## 2018-10-09 RX ORDER — PREDNISONE 5 MG/1
TABLET ORAL
Qty: 135 TABLET | Refills: 3 | Status: SHIPPED | OUTPATIENT
Start: 2018-10-09 | End: 2019-12-26

## 2018-12-18 PROBLEM — H53.2 DIPLOPIA: Status: ACTIVE | Noted: 2018-12-18

## 2018-12-19 PROBLEM — E44.1 MALNUTRITION OF MILD DEGREE: Status: ACTIVE | Noted: 2018-12-19

## 2019-01-09 PROBLEM — R11.0 NAUSEA: Status: ACTIVE | Noted: 2019-01-09

## 2019-02-08 ENCOUNTER — INITIAL CONSULT (OUTPATIENT)
Dept: OPHTHALMOLOGY | Facility: CLINIC | Age: 74
End: 2019-02-08
Payer: MEDICARE

## 2019-02-08 DIAGNOSIS — H53.2 DIPLOPIA: Primary | ICD-10-CM

## 2019-02-08 PROCEDURE — 92004 PR EYE EXAM, NEW PATIENT,COMPREHESV: ICD-10-PCS | Mod: S$GLB,,, | Performed by: OPHTHALMOLOGY

## 2019-02-08 PROCEDURE — 99999 PR PBB SHADOW E&M-EST. PATIENT-LVL II: CPT | Mod: PBBFAC,,, | Performed by: OPHTHALMOLOGY

## 2019-02-08 PROCEDURE — 99999 PR PBB SHADOW E&M-EST. PATIENT-LVL II: ICD-10-PCS | Mod: PBBFAC,,, | Performed by: OPHTHALMOLOGY

## 2019-02-08 PROCEDURE — 92004 COMPRE OPH EXAM NEW PT 1/>: CPT | Mod: S$GLB,,, | Performed by: OPHTHALMOLOGY

## 2019-02-08 RX ORDER — TRAZODONE HYDROCHLORIDE 100 MG/1
TABLET ORAL
COMMUNITY
End: 2019-04-02

## 2019-02-08 RX ORDER — PNV NO.95/FERROUS FUM/FOLIC AC 28MG-0.8MG
100 TABLET ORAL DAILY
COMMUNITY

## 2019-02-08 RX ORDER — CEPHALEXIN 250 MG
CAPSULE ORAL
COMMUNITY
End: 2019-04-02

## 2019-02-08 RX ORDER — BIOTIN 1000 MCG
1 TABLET,CHEWABLE ORAL DAILY
COMMUNITY

## 2019-02-08 NOTE — LETTER
[facility] - Ophthalmology  [address]  [facility] [address]  Phone: [phone]  Fax: [phone]   February 8, 2019    Wolfgang Goyal MD  [address]  [address]  [facility] [address]    Patient: Telma Fraser   MR Number: [id number]   YOB: 1945   Date of Visit: 2/8/2019       Dear Dr. Goyal:    Thank you for referring Telma Fraser to me for evaluation. Here is my assessment and plan of care:    Assessment:   /Plan     For exam results, see Encounter Report.    Diplopia      Ms. Fraser had transient binocular diplopia that resolved completely in about two days. She no longer has symptoms and I could not elicit any ocular misalignment. 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 No further evaluation is indicated. She will return to me as requested.          Plan:       For exam results, see Encounter Report.    Diplopia      Ms. Fraser had transient binocular diplopia that resolved completely in about two days. She no longer has symptoms and I could not elicit any ocular misalignment. 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 No further evaluation is indicated. She will return to me as requested.            Below you will find my full exam findings. If you have questions, please do not hesitate to call me. I look forward to following Ms. Telma Fraser along with you.    Sincerely,          Mark Lr MD       CC  Rome Maravilla MD             Base Eye Exam     Visual Acuity (Snellen - Linear)       Right Left    Dist cc 20/25 -2 20/25 -2    Correction:  Glasses          Pupils       Dark Light Shape React APD    Right 4 2 Round Brisk None    Left 4 2 Round Brisk None          Visual Fields (Counting fingers)       Right Left     Full Full          Extraocular Movement       Right Left     Full, Ortho Full, Ortho          Neuro/Psych      Oriented x3:  Yes    Mood/Affect:  Normal          Dilation     Both eyes:  1% Mydriacyl, 2.5% Phenylephrine @ 10:30 AM            Slit Lamp and Fundus Exam     External Exam       Right Left    External Normal Normal          Slit Lamp Exam       Right Left    Lids/Lashes Normal Normal    Conjunctiva/Sclera White and quiet White and quiet    Cornea Arcus Arcus     Anterior Chamber Deep and quiet Deep and quiet    Iris Round and reactive Round and reactive    Lens Posterior chamber intraocular lens Posterior chamber intraocular lens    Vitreous Normal Normal          Fundus Exam       Right Left    Disc Normal Normal    C/D Ratio 0.3 0.3    Macula Normal Normal    Vessels Normal Normal    Periphery Normal Normal

## 2019-02-08 NOTE — PROGRESS NOTES
HPI     Referred by Dr.Daniel ANNIE Goyal/Dr.Rex Maravilla(Neurologist)  Hospitalized in 12/2018 for diplopia which thought she was having a   stroke.  Patient states double have cleared up since the hospital stay.  Patient states her  wanted her to get a check since all this   happen.  OS little throbbing sometimes.    I have personally interviewed the patient, reviewed the history and   examined the patient and agree with the technician's &/or resident's exam,   assessment and plan.    Last edited by Mark Lr MD on 2/8/2019 10:30 AM. (History)            Assessment /Plan     For exam results, see Encounter Report.    Diplopia      Ms. Fraser had transient binocular diplopia that resolved completely in about two days. She no longer has symptoms and I could not elicit any ocular misalignment. 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 No further evaluation is indicated. She will return to me as requested.

## 2019-03-18 ENCOUNTER — HOSPITAL ENCOUNTER (OUTPATIENT)
Dept: RADIOLOGY | Facility: HOSPITAL | Age: 74
Discharge: HOME | End: 2019-03-18
Attending: INTERNAL MEDICINE
Payer: MEDICARE

## 2019-03-18 DIAGNOSIS — E27.40 ADRENAL INSUFFICIENCY: ICD-10-CM

## 2019-03-18 DIAGNOSIS — E04.2 MULTINODULAR GOITER: ICD-10-CM

## 2019-03-18 PROCEDURE — 76536 US SOFT TISSUE HEAD NECK THYROID: ICD-10-PCS | Mod: 26,,, | Performed by: RADIOLOGY

## 2019-03-18 PROCEDURE — 76536 US EXAM OF HEAD AND NECK: CPT | Mod: TC,PO

## 2019-03-18 PROCEDURE — 76536 US EXAM OF HEAD AND NECK: CPT | Mod: 26,,, | Performed by: RADIOLOGY

## 2019-04-02 ENCOUNTER — OFFICE VISIT (OUTPATIENT)
Dept: ENDOCRINOLOGY | Facility: CLINIC | Age: 74
End: 2019-04-02
Payer: MEDICARE

## 2019-04-02 VITALS
SYSTOLIC BLOOD PRESSURE: 82 MMHG | DIASTOLIC BLOOD PRESSURE: 50 MMHG | HEIGHT: 65 IN | WEIGHT: 101.44 LBS | BODY MASS INDEX: 16.9 KG/M2 | HEART RATE: 70 BPM

## 2019-04-02 DIAGNOSIS — E22.2 SIADH (SYNDROME OF INAPPROPRIATE ADH PRODUCTION): ICD-10-CM

## 2019-04-02 DIAGNOSIS — E04.2 MULTINODULAR GOITER: ICD-10-CM

## 2019-04-02 DIAGNOSIS — E27.40 ADRENAL INSUFFICIENCY: ICD-10-CM

## 2019-04-02 DIAGNOSIS — M81.0 OSTEOPOROSIS, UNSPECIFIED OSTEOPOROSIS TYPE, UNSPECIFIED PATHOLOGICAL FRACTURE PRESENCE: Primary | ICD-10-CM

## 2019-04-02 PROCEDURE — 99214 PR OFFICE/OUTPT VISIT, EST, LEVL IV, 30-39 MIN: ICD-10-PCS | Mod: S$GLB,,, | Performed by: INTERNAL MEDICINE

## 2019-04-02 PROCEDURE — 99999 PR PBB SHADOW E&M-EST. PATIENT-LVL III: CPT | Mod: PBBFAC,,, | Performed by: INTERNAL MEDICINE

## 2019-04-02 PROCEDURE — 99214 OFFICE O/P EST MOD 30 MIN: CPT | Mod: S$GLB,,, | Performed by: INTERNAL MEDICINE

## 2019-04-02 PROCEDURE — 3074F SYST BP LT 130 MM HG: CPT | Mod: CPTII,S$GLB,, | Performed by: INTERNAL MEDICINE

## 2019-04-02 PROCEDURE — 3078F DIAST BP <80 MM HG: CPT | Mod: CPTII,S$GLB,, | Performed by: INTERNAL MEDICINE

## 2019-04-02 PROCEDURE — 1101F PR PT FALLS ASSESS DOC 0-1 FALLS W/OUT INJ PAST YR: ICD-10-PCS | Mod: CPTII,S$GLB,, | Performed by: INTERNAL MEDICINE

## 2019-04-02 PROCEDURE — 3074F PR MOST RECENT SYSTOLIC BLOOD PRESSURE < 130 MM HG: ICD-10-PCS | Mod: CPTII,S$GLB,, | Performed by: INTERNAL MEDICINE

## 2019-04-02 PROCEDURE — 1101F PT FALLS ASSESS-DOCD LE1/YR: CPT | Mod: CPTII,S$GLB,, | Performed by: INTERNAL MEDICINE

## 2019-04-02 PROCEDURE — 99999 PR PBB SHADOW E&M-EST. PATIENT-LVL III: ICD-10-PCS | Mod: PBBFAC,,, | Performed by: INTERNAL MEDICINE

## 2019-04-02 PROCEDURE — 3078F PR MOST RECENT DIASTOLIC BLOOD PRESSURE < 80 MM HG: ICD-10-PCS | Mod: CPTII,S$GLB,, | Performed by: INTERNAL MEDICINE

## 2019-04-02 NOTE — PROGRESS NOTES
CHIEF COMPLAINT: Multinodular Goiter.   73 year old being seen as a f/u. She had a benign FNA 3/21/13. 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 She is seeing nephrology. Started on a trial of hydrocortisone for possible adrenal insufficiency (could not R/O based on cosyntropin stim test and having fatigue). On prednisone 7.5 currently. Taking Ca + D. Had a fracture of the pelvis Sept 2018 after a fall when walking to the bathroom. No dysphagia or GERD.             PAST MEDICAL HISTORY: Asthma, WPW, depression/anxiety, hyperlipidemia.     PAST SURGICAL HISTORY: right knee replacement. Hand surgery.     SOCIAL HISTORY: No T/A     FAMILY HISTORY: No known thyroid disease or thyroid cancer. Dementia.     MEDICATIONS/ALLERGIES: The patient's MedCard has been updated and reviewed.     ROS:   Constitutional: weight increased.   ENT: No dysphagia    Cardiovascular: No CP  Respiratory: SOB at baseline   Gastrointestinal: Seeing GI for nausea and difficulty swallowing.   GenitoUrinary - No dysuria   Skin: No new skin rash   Neurologic: No focal neurologic complaints   Remainder ROS negative         PE:   GENERAL: Well developed, well nourished.   NECK: Supple, trachea midline, Left nodule palpable. No LAD   CHEST: Resp even and unlabored, CTA bilateral.   CARDIAC: RRR, S1, S2 heard, no murmurs, rubs, S3, or S4       Results for KIMDOLLY MARIA A (MRN [id number]) as of 4/2/2019 13:19   Ref. Range 3/18/2019 10:30   Sodium Latest Ref Range: 136 - 145 mmol/L 137   Potassium Latest Ref Range: 3.5 - 5.1 mmol/L 3.3 (L)   Chloride Latest Ref Range: 95 - 110 mmol/L 104   CO2 Latest Ref Range: 23 - 29 mmol/L 27   Anion Gap Latest Ref Range: 8 - 16 mmol/L 6 (L)   BUN, Bld Latest Ref Range: 8 - 23 mg/dL 8   Creatinine Latest Ref Range: 0.5 - 1.4 mg/dL 0.9   eGFR if non African American Latest Ref Range: >60 mL/min/1.73 m^2 >60.0   eGFR if African American Latest Ref Range: >60 mL/min/1.73 m^2 >60.0   Glucose Latest Ref Range: 70 - 110 mg/dL  72   Calcium Latest Ref Range: 8.7 - 10.5 mg/dL 9.0   Alkaline Phosphatase Latest Ref Range: 55 - 135 U/L 45 (L)   Total Protein Latest Ref Range: 6.0 - 8.4 g/dL 6.4   Albumin Latest Ref Range: 3.5 - 5.2 g/dL 3.2 (L)   Total Bilirubin Latest Ref Range: 0.1 - 1.0 mg/dL 0.3   AST Latest Ref Range: 10 - 40 U/L 26   ALT Latest Ref Range: 10 - 44 U/L 13   ACTH Latest Ref Range: 0 - 46 pg/mL 8   TSH Latest Ref Range: 0.400 - 4.000 uIU/mL 2.586       US SOFT TISSUE HEAD NECK THYROID    CLINICAL HISTORY:  Unspecified adrenocortical insufficiency    TECHNIQUE:  Ultrasound of the thyroid and cervical lymph nodes was performed.    COMPARISON:  07/21/2017    FINDINGS:  The right lobe of thyroid gland measures 3.6 x 1.4 x 1.2 cm in size.  The left lobe the thyroid gland measures 4.0 x 1.5 x 1.4cm in size.  This gives an approximate volume of on the right of 3.2cc and an approximate volume on the left of 4.4 cc for a total approximate volume of 7.6 cc.    Multiple thyroid nodules are noted without worrisome detrimental change since 07/21/2017.  The largest on the right is near the isthmus of 1.3 x 1.0 x 0.7 cm unchanged given inter study variance since the prior.  This is predominantly solid, isoechoic, and without microcalcifications.    The largest nodule on the right is of 1.4 x 1.4 x 1.7 cm without worrisome detrimental change since 07/21/2017 that is predominantly solid with a coarse calcification centrally along with several possible microcalcifications.  This nodule appears unchanged since 07/21/2017 given inter study variance.  Several smaller nodules are noted that also appear  stable since the prior.  The 2nd largest is of 1.2 cm unchanged possibly with microcalcifications. The 2 largest on the left meet size criteria for fine-needle aspiration or biopsy but appear stable since the prior      Impression       1. Multiple thyroid nodules  without worrisome detrimental changes since 07/21/2017 noted.            ASSESSMENT/PLAN:   1. Adrenal Insuffiency- Labs borderline for AI. Since having some symptoms that could be AI related decided to do a trial of steroids. Repeat testing could not rule out AI and symptoms improved. Continue current dose of steroids since feeling better on steroids. Sick day precautions.     2. Multinodular goiter-S/P benign FNA of left nodule. US stable. Continue current tx.     3. SIADH- Na stable. Being followed by nephrology    4. Osteoporosis- has had a pelvis fracture. Start Prolia. Discussed s/e.     FOLLOWUP  Start Prolia- Unable to put in orders  Copy of labs and US.   F/U 6 months with CMP

## 2019-04-10 ENCOUNTER — INFUSION (OUTPATIENT)
Dept: INFUSION THERAPY | Facility: HOSPITAL | Age: 74
End: 2019-04-10
Attending: INTERNAL MEDICINE
Payer: MEDICARE

## 2019-04-10 VITALS
HEART RATE: 70 BPM | DIASTOLIC BLOOD PRESSURE: 52 MMHG | SYSTOLIC BLOOD PRESSURE: 95 MMHG | TEMPERATURE: 98 F | RESPIRATION RATE: 19 BRPM

## 2019-04-10 DIAGNOSIS — E55.9 VITAMIN D DEFICIENCY: ICD-10-CM

## 2019-04-10 DIAGNOSIS — S32.592A CLOSED FRACTURE OF LEFT INFERIOR PUBIC RAMUS, INITIAL ENCOUNTER: ICD-10-CM

## 2019-04-10 DIAGNOSIS — S32.82XD MULTIPLE CLOSED FRACTURES OF PELVIS WITH ROUTINE HEALING, SUBSEQUENT ENCOUNTER: Primary | ICD-10-CM

## 2019-04-10 PROCEDURE — 63600175 PHARM REV CODE 636 W HCPCS: Mod: JG,PN | Performed by: INTERNAL MEDICINE

## 2019-04-10 PROCEDURE — 96372 THER/PROPH/DIAG INJ SC/IM: CPT | Mod: PN

## 2019-04-10 RX ADMIN — DENOSUMAB 60 MG: 60 INJECTION SUBCUTANEOUS at 10:04

## 2019-05-07 ENCOUNTER — TELEPHONE (OUTPATIENT)
Dept: ENDOCRINOLOGY | Facility: CLINIC | Age: 74
End: 2019-05-07

## 2019-05-07 NOTE — TELEPHONE ENCOUNTER
----- Message from Nicolas Vides sent at 5/7/2019 11:10 AM CDT -----  Type:  Patient Call Back    Who Called:  pt  Does the patient know what this is regarding?: please mail results to the pt  Best Call Back Number: [phone]  Additional Information:  Please call pt and leave a detailed message if there is no answer.

## 2019-07-26 PROBLEM — M81.0 AGE-RELATED OSTEOPOROSIS WITHOUT CURRENT PATHOLOGICAL FRACTURE: Status: ACTIVE | Noted: 2019-07-26

## 2019-08-09 PROBLEM — E46 PROTEIN-CALORIE MALNUTRITION: Status: ACTIVE | Noted: 2019-08-09

## 2019-08-12 ENCOUNTER — TELEPHONE (OUTPATIENT)
Dept: ENDOCRINOLOGY | Facility: CLINIC | Age: 74
End: 2019-08-12

## 2019-08-12 NOTE — TELEPHONE ENCOUNTER
----- Message from Jena Nelson DO sent at 8/12/2019 12:42 PM CDT -----  Regarding: RE: continued weight loss  Thanks for the update. She is on a good amount of prednisone at 7.5 mg daily. For her weight, she would normally need 5 mg. Let me have my staff set up an appt with her to evaluate    My Staff: she needs appt to discuss weight loss. OK to use next available urgent spot    Thanks  Jean  ----- Message -----  From: ANNIE Kelley Jr., MD  Sent: 8/9/2019   4:18 PM  To: Jean Nelson DO  Subject: continued weight loss                            Good afternoon Jean,   I am reviewing this patient's chart. I saw her this week. Her continued weight loss is of concern. She is now below 100 lbs. Anorexic. Thought you might want to know of her status. I am open to any suggestions.   Thanks and have a good weekend,  [place]

## 2019-08-28 ENCOUNTER — OFFICE VISIT (OUTPATIENT)
Dept: ENDOCRINOLOGY | Facility: CLINIC | Age: 74
End: 2019-08-28
Payer: MEDICARE

## 2019-08-28 VITALS
HEART RATE: 59 BPM | HEIGHT: 65 IN | BODY MASS INDEX: 16.98 KG/M2 | DIASTOLIC BLOOD PRESSURE: 54 MMHG | SYSTOLIC BLOOD PRESSURE: 120 MMHG | WEIGHT: 101.94 LBS

## 2019-08-28 DIAGNOSIS — E04.2 MULTINODULAR GOITER: ICD-10-CM

## 2019-08-28 DIAGNOSIS — E22.2 SIADH (SYNDROME OF INAPPROPRIATE ADH PRODUCTION): ICD-10-CM

## 2019-08-28 DIAGNOSIS — M81.0 OSTEOPOROSIS, UNSPECIFIED OSTEOPOROSIS TYPE, UNSPECIFIED PATHOLOGICAL FRACTURE PRESENCE: ICD-10-CM

## 2019-08-28 DIAGNOSIS — R63.4 WEIGHT LOSS: ICD-10-CM

## 2019-08-28 DIAGNOSIS — E27.40 ADRENAL INSUFFICIENCY: Primary | ICD-10-CM

## 2019-08-28 PROCEDURE — 99214 PR OFFICE/OUTPT VISIT, EST, LEVL IV, 30-39 MIN: ICD-10-PCS | Mod: S$GLB,,, | Performed by: INTERNAL MEDICINE

## 2019-08-28 PROCEDURE — 99214 OFFICE O/P EST MOD 30 MIN: CPT | Mod: S$GLB,,, | Performed by: INTERNAL MEDICINE

## 2019-08-28 PROCEDURE — 3074F SYST BP LT 130 MM HG: CPT | Mod: CPTII,S$GLB,, | Performed by: INTERNAL MEDICINE

## 2019-08-28 PROCEDURE — 3078F PR MOST RECENT DIASTOLIC BLOOD PRESSURE < 80 MM HG: ICD-10-PCS | Mod: CPTII,S$GLB,, | Performed by: INTERNAL MEDICINE

## 2019-08-28 PROCEDURE — 1101F PT FALLS ASSESS-DOCD LE1/YR: CPT | Mod: CPTII,S$GLB,, | Performed by: INTERNAL MEDICINE

## 2019-08-28 PROCEDURE — 3078F DIAST BP <80 MM HG: CPT | Mod: CPTII,S$GLB,, | Performed by: INTERNAL MEDICINE

## 2019-08-28 PROCEDURE — 3074F PR MOST RECENT SYSTOLIC BLOOD PRESSURE < 130 MM HG: ICD-10-PCS | Mod: CPTII,S$GLB,, | Performed by: INTERNAL MEDICINE

## 2019-08-28 PROCEDURE — 1101F PR PT FALLS ASSESS DOC 0-1 FALLS W/OUT INJ PAST YR: ICD-10-PCS | Mod: CPTII,S$GLB,, | Performed by: INTERNAL MEDICINE

## 2019-08-28 PROCEDURE — 99999 PR PBB SHADOW E&M-EST. PATIENT-LVL III: ICD-10-PCS | Mod: PBBFAC,,, | Performed by: INTERNAL MEDICINE

## 2019-08-28 PROCEDURE — 99999 PR PBB SHADOW E&M-EST. PATIENT-LVL III: CPT | Mod: PBBFAC,,, | Performed by: INTERNAL MEDICINE

## 2019-08-28 NOTE — PROGRESS NOTES
CHIEF COMPLAINT: Multinodular Goiter.   73 year old being seen as a f/u. She had a benign FNA 3/21/13. 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 She is seeing nephrology. Started on a trial of hydrocortisone for possible adrenal insufficiency (could not R/O based on cosyntropin stim test and having fatigue). On prednisone 7.5 currently. Taking Ca + D. Had a fracture of the pelvis Sept 2018 after a fall when walking to the bathroom.   Had been losing weigh. Was 99.5 in 8/7/129. 101 today. Was 130 in early 2018. Had tried hydrocortisone in 2016 and she stopped due to palpitations and switched to prednisone. Has had some nausea but no vomiting. Not lightheaded when standing. Eats 3 meals a day. No snack. Does 1-2 Ensure a day. Every night has carrot cake.           PAST MEDICAL HISTORY: Asthma, WPW, depression/anxiety, hyperlipidemia.     PAST SURGICAL HISTORY: right knee replacement. Hand surgery.     SOCIAL HISTORY: No T/A     FAMILY HISTORY: No known thyroid disease or thyroid cancer. Dementia.     MEDICATIONS/ALLERGIES: The patient's MedCard has been updated and reviewed.     ROS:   Constitutional: weight increased.   ENT: No dysphagia    Cardiovascular: No CP  Respiratory: SOB at baseline   Gastrointestinal: Seeing GI for nausea and difficulty swallowing.   GenitoUrinary - No dysuria   Skin: No new skin rash   Neurologic: No focal neurologic complaints   Remainder ROS negative         PE:   GENERAL: Well developed, well nourished.   NECK: Supple, trachea midline, Left nodule palpable. No LAD   CHEST: Resp even and unlabored, CTA bilateral.   CARDIAC: RRR, S1, S2 heard, no murmurs, rubs, S3, or S4             ASSESSMENT/PLAN:   1. Adrenal Insuffiency- Labs borderline for AI. Since having some symptoms that could be AI related decided to do a trial of steroids. Repeat testing could not rule out AI and symptoms improved. Continue current dose of steroids since feeling better on steroids. Unclear why losing weight. On  the higher end of steroid dosing so may not be the cause of weight loss. Discussed trying hydrocortisone but had tried that in the past and states did not tolerate that well.     2. Multinodular goiter-S/P benign FNA of left nodule. US stable.     3. SIADH- . Being followed by nephrology. Check Na with next labs.     4. Osteoporosis- has had a pelvis fracture. Start Prolia. Discussed s/e.     5. Weight loss- see # 1. Will do w/u as seen below.     FOLLOWUP  TSH, FT4, ACTH, CMP next week- hold biotin

## 2019-09-05 ENCOUNTER — LAB VISIT (OUTPATIENT)
Dept: LAB | Facility: HOSPITAL | Age: 74
End: 2019-09-05
Attending: INTERNAL MEDICINE
Payer: MEDICARE

## 2019-09-05 DIAGNOSIS — M81.0 OSTEOPOROSIS, UNSPECIFIED OSTEOPOROSIS TYPE, UNSPECIFIED PATHOLOGICAL FRACTURE PRESENCE: ICD-10-CM

## 2019-09-05 DIAGNOSIS — E27.40 ADRENAL INSUFFICIENCY: ICD-10-CM

## 2019-09-05 DIAGNOSIS — E04.2 MULTINODULAR GOITER: ICD-10-CM

## 2019-09-05 DIAGNOSIS — E22.2 SIADH (SYNDROME OF INAPPROPRIATE ADH PRODUCTION): ICD-10-CM

## 2019-09-05 LAB
ALBUMIN SERPL BCP-MCNC: 3.4 G/DL (ref 3.5–5.2)
ALP SERPL-CCNC: 31 U/L (ref 55–135)
ALT SERPL W/O P-5'-P-CCNC: 14 U/L (ref 10–44)
ANION GAP SERPL CALC-SCNC: 10 MMOL/L (ref 8–16)
AST SERPL-CCNC: 25 U/L (ref 10–40)
BILIRUB SERPL-MCNC: 0.4 MG/DL (ref 0.1–1)
BUN SERPL-MCNC: 9 MG/DL (ref 8–23)
CALCIUM SERPL-MCNC: 8.1 MG/DL (ref 8.7–10.5)
CHLORIDE SERPL-SCNC: 103 MMOL/L (ref 95–110)
CO2 SERPL-SCNC: 22 MMOL/L (ref 23–29)
CREAT SERPL-MCNC: 1 MG/DL (ref 0.5–1.4)
EST. GFR  (AFRICAN AMERICAN): >60 ML/MIN/1.73 M^2
EST. GFR  (NON AFRICAN AMERICAN): 56.4 ML/MIN/1.73 M^2
GLUCOSE SERPL-MCNC: 66 MG/DL (ref 70–110)
POTASSIUM SERPL-SCNC: 3.3 MMOL/L (ref 3.5–5.1)
PROT SERPL-MCNC: 5.9 G/DL (ref 6–8.4)
SODIUM SERPL-SCNC: 135 MMOL/L (ref 136–145)
T4 FREE SERPL-MCNC: 0.84 NG/DL (ref 0.71–1.51)
TSH SERPL DL<=0.005 MIU/L-ACNC: 2.77 UIU/ML (ref 0.4–4)

## 2019-09-05 PROCEDURE — 84439 ASSAY OF FREE THYROXINE: CPT

## 2019-09-05 PROCEDURE — 36415 COLL VENOUS BLD VENIPUNCTURE: CPT | Mod: PO

## 2019-09-05 PROCEDURE — 82024 ASSAY OF ACTH: CPT

## 2019-09-05 PROCEDURE — 80053 COMPREHEN METABOLIC PANEL: CPT

## 2019-09-05 PROCEDURE — 84443 ASSAY THYROID STIM HORMONE: CPT

## 2019-09-06 LAB — ACTH PLAS-MCNC: 9 PG/ML (ref 0–46)

## 2019-09-10 ENCOUNTER — TELEPHONE (OUTPATIENT)
Dept: ENDOCRINOLOGY | Facility: CLINIC | Age: 74
End: 2019-09-10

## 2019-09-10 NOTE — TELEPHONE ENCOUNTER
----- Message from Jean Nelson DO sent at 9/10/2019 12:01 PM CDT -----  Let her know that her thyroid levels are normal  ----- Message -----  From: Jaye Luong LPN  Sent: 9/9/2019   4:20 PM  To: Jean Nelson DO    Please review labs and advise.   ----- Message -----  From: Brina Luke  Sent: 9/9/2019   4:14 PM  To: Omar Pena Staff (Endo)      Type:  Test Results    Who Called:  pt  Name of Test (Lab/Mammo/Etc):   Lab  wk  Best Call Back Number:  [phone]  Additional Information:    Calling  For test  results

## 2019-10-07 ENCOUNTER — TELEPHONE (OUTPATIENT)
Dept: ENDOCRINOLOGY | Facility: CLINIC | Age: 74
End: 2019-10-07

## 2019-10-07 DIAGNOSIS — M81.0 OSTEOPOROSIS, UNSPECIFIED OSTEOPOROSIS TYPE, UNSPECIFIED PATHOLOGICAL FRACTURE PRESENCE: Primary | ICD-10-CM

## 2019-10-07 NOTE — TELEPHONE ENCOUNTER
Her last ca was on the low end. Have her do a CMP, PTH, Phos, Vit D ionized ca. Would like to see serum Ca higher before getting Prolia

## 2019-10-07 NOTE — TELEPHONE ENCOUNTER
Spoke to pt and adv of Dr Nelson's previous message. Pt stated she will do labs tomorrow at [facility].

## 2019-10-10 ENCOUNTER — TELEPHONE (OUTPATIENT)
Dept: ENDOCRINOLOGY | Facility: CLINIC | Age: 74
End: 2019-10-10

## 2019-10-10 DIAGNOSIS — M81.0 OSTEOPOROSIS, UNSPECIFIED OSTEOPOROSIS TYPE, UNSPECIFIED PATHOLOGICAL FRACTURE PRESENCE: Primary | ICD-10-CM

## 2019-10-10 NOTE — TELEPHONE ENCOUNTER
S/w pt. Informed Dr. Nelson signed orders for Prolia today. Okay to get injection on Monday. Verbalized understanding.

## 2019-10-10 NOTE — TELEPHONE ENCOUNTER
----- Message from Jean Nelson DO sent at 10/10/2019  3:07 PM CDT -----  Contact: patient  Her corrected Ca is normal. Her ionized Ca is pending. But ok for her to get prolia.   ----- Message -----  From: Jaye Luong LPN  Sent: 10/10/2019   1:29 PM CDT  To: Jean Nelson DO    Pt is requesting results from 10/8/19.   ----- Message -----  From: Tisha Clayton  Sent: 10/10/2019  12:42 PM CDT  To: Omar Pena Staff (Endo)    Type: Needs Medical Advice    Who Called:  patient  Symptoms (please be specific):    How long has patient had these symptoms:    Pharmacy name and phone #:    Best Call Back Number: [phone]  Additional Information: requesting a call back regarding test results

## 2019-10-10 NOTE — TELEPHONE ENCOUNTER
----- Message from Corie Brock sent at 10/10/2019  2:30 PM CDT -----  Contact: patient   Type:  Patient Returning Call    Who Called: patient   Who Left Message for Patient:  n/a  Does the patient know what this is regarding?:  n/a  Best Call Back Number:  [phone] (home)

## 2019-10-14 ENCOUNTER — INFUSION (OUTPATIENT)
Dept: INFUSION THERAPY | Facility: HOSPITAL | Age: 74
End: 2019-10-14
Attending: INTERNAL MEDICINE
Payer: MEDICARE

## 2019-10-14 VITALS
HEART RATE: 64 BPM | HEIGHT: 65 IN | DIASTOLIC BLOOD PRESSURE: 48 MMHG | SYSTOLIC BLOOD PRESSURE: 85 MMHG | BODY MASS INDEX: 16.53 KG/M2 | RESPIRATION RATE: 18 BRPM | TEMPERATURE: 99 F | WEIGHT: 99.19 LBS

## 2019-10-14 DIAGNOSIS — M81.0 OSTEOPOROSIS, UNSPECIFIED OSTEOPOROSIS TYPE, UNSPECIFIED PATHOLOGICAL FRACTURE PRESENCE: Primary | ICD-10-CM

## 2019-10-14 DIAGNOSIS — S32.82XD MULTIPLE CLOSED FRACTURES OF PELVIS WITH ROUTINE HEALING, SUBSEQUENT ENCOUNTER: ICD-10-CM

## 2019-10-14 DIAGNOSIS — S32.592A CLOSED FRACTURE OF LEFT INFERIOR PUBIC RAMUS, INITIAL ENCOUNTER: ICD-10-CM

## 2019-10-14 DIAGNOSIS — E55.9 VITAMIN D DEFICIENCY: ICD-10-CM

## 2019-10-14 PROCEDURE — 96372 THER/PROPH/DIAG INJ SC/IM: CPT | Mod: PN

## 2019-10-14 PROCEDURE — 63600175 PHARM REV CODE 636 W HCPCS: Mod: JG,PN | Performed by: INTERNAL MEDICINE

## 2019-10-14 RX ADMIN — DENOSUMAB 60 MG: 60 INJECTION SUBCUTANEOUS at 10:10

## 2019-10-14 NOTE — PLAN OF CARE
Pt educated to continue calcium and vit D supplement. Pt also educated to refrain from invasive oral procedures for 3 months. Pt verbalized understanding

## 2019-10-14 NOTE — PLAN OF CARE
Pt tolerated tx well AVS given to pt Pt educated to call Dr with any issues. Pt verbalized understanding. Pt adequate for discharge.

## 2019-12-06 PROBLEM — I70.0 THORACIC AORTA ATHEROSCLEROSIS: Status: ACTIVE | Noted: 2019-12-06

## 2019-12-26 RX ORDER — PREDNISONE 5 MG/1
TABLET ORAL
Qty: 135 TABLET | Refills: 3 | Status: SHIPPED | OUTPATIENT
Start: 2019-12-26 | End: 2020-05-14

## 2020-04-09 ENCOUNTER — TELEPHONE (OUTPATIENT)
Dept: ENDOCRINOLOGY | Facility: CLINIC | Age: 75
End: 2020-04-09

## 2020-04-09 NOTE — TELEPHONE ENCOUNTER
"S/w pt. Unable to do virtual visit. Scheduled carter w/ Dr. Nelson and labeled as a "Telephone call visit". Verbalized understanding.   "

## 2020-04-09 NOTE — TELEPHONE ENCOUNTER
S/w pt. States she has been freezing constantly, cannot get warm. Dr. Kelley had her on Amoxacillin x10 days and then Cipro [id number]. Hasn't been on anything this week. States she always has the chills/is freezing, no fever. She wakes up soaking wet and sometimes has to change her shirt x3 during the night. Please advise.

## 2020-04-09 NOTE — TELEPHONE ENCOUNTER
----- Message from Salma Pena sent at 4/9/2020 12:55 PM CDT -----  Contact: pt  Type: Needs Medical Advice    Who Called:  Pt  Best Call Back Number: [phone]  Additional Information: Requesting a call back regarding pt stated she getting chills and wanted to speak with doctor   Please Advise ---Thank you

## 2020-04-13 ENCOUNTER — TELEPHONE (OUTPATIENT)
Dept: ENDOCRINOLOGY | Facility: CLINIC | Age: 75
End: 2020-04-13

## 2020-04-13 DIAGNOSIS — E27.40 ADRENAL INSUFFICIENCY: Primary | ICD-10-CM

## 2020-04-13 NOTE — TELEPHONE ENCOUNTER
Unable to do Virtual Visit so called patient- due to COVID  For past 5-6 weeks states she has been feeling cold.   At night she is sweating. States no fever. States started on abx. Also tried cipro.   No CP, SOB  No N/V    Check TSH, CBC, CMP prior to appt May 14- not fasting

## 2020-04-17 ENCOUNTER — TELEPHONE (OUTPATIENT)
Dept: ENDOCRINOLOGY | Facility: CLINIC | Age: 75
End: 2020-04-17

## 2020-04-17 NOTE — TELEPHONE ENCOUNTER
----- Message from Antonio Torres sent at 4/17/2020  2:03 PM CDT -----  Contact: pt  Pt called and would like to speak to the nurse about some blood work    Pt can be reached at [phone]

## 2020-04-17 NOTE — TELEPHONE ENCOUNTER
Pt requests the lab orders be faxed to [facility] at [phone] for her upcoming appt so that she does not have to come in for them.  Orders faxed.

## 2020-04-30 ENCOUNTER — DOCUMENT SCAN (OUTPATIENT)
Dept: HOME HEALTH SERVICES | Facility: HOSPITAL | Age: 75
End: 2020-04-30
Payer: MEDICARE

## 2020-05-01 ENCOUNTER — DOCUMENT SCAN (OUTPATIENT)
Dept: HOME HEALTH SERVICES | Facility: HOSPITAL | Age: 75
End: 2020-05-01
Payer: MEDICARE

## 2020-05-08 ENCOUNTER — LAB VISIT (OUTPATIENT)
Dept: INFUSION THERAPY | Facility: HOSPITAL | Age: 75
End: 2020-05-08
Attending: INTERNAL MEDICINE
Payer: MEDICARE

## 2020-05-08 DIAGNOSIS — Z03.818 ENCNTR FOR OBS FOR SUSP EXPSR TO OTH BIOLG AGENTS RULED OUT: Primary | ICD-10-CM

## 2020-05-08 PROCEDURE — U0002 COVID-19 LAB TEST NON-CDC: HCPCS

## 2020-05-09 LAB — SARS-COV-2 RNA RESP QL NAA+PROBE: NOT DETECTED

## 2020-05-11 ENCOUNTER — TELEPHONE (OUTPATIENT)
Dept: ENDOCRINOLOGY | Facility: CLINIC | Age: 75
End: 2020-05-11

## 2020-05-12 ENCOUNTER — INFUSION (OUTPATIENT)
Dept: INFUSION THERAPY | Facility: HOSPITAL | Age: 75
End: 2020-05-12
Attending: INTERNAL MEDICINE
Payer: MEDICARE

## 2020-05-12 VITALS
RESPIRATION RATE: 16 BRPM | SYSTOLIC BLOOD PRESSURE: 91 MMHG | DIASTOLIC BLOOD PRESSURE: 60 MMHG | HEART RATE: 76 BPM | TEMPERATURE: 99 F

## 2020-05-12 DIAGNOSIS — M81.0 OSTEOPOROSIS, UNSPECIFIED OSTEOPOROSIS TYPE, UNSPECIFIED PATHOLOGICAL FRACTURE PRESENCE: Primary | ICD-10-CM

## 2020-05-12 DIAGNOSIS — E55.9 VITAMIN D DEFICIENCY: ICD-10-CM

## 2020-05-12 DIAGNOSIS — S32.82XD MULTIPLE CLOSED FRACTURES OF PELVIS WITH ROUTINE HEALING, SUBSEQUENT ENCOUNTER: ICD-10-CM

## 2020-05-12 DIAGNOSIS — S32.592A CLOSED FRACTURE OF LEFT INFERIOR PUBIC RAMUS, INITIAL ENCOUNTER: ICD-10-CM

## 2020-05-12 PROCEDURE — 63600175 PHARM REV CODE 636 W HCPCS: Mod: JG,PN | Performed by: INTERNAL MEDICINE

## 2020-05-12 PROCEDURE — 96372 THER/PROPH/DIAG INJ SC/IM: CPT | Mod: PN

## 2020-05-12 RX ADMIN — DENOSUMAB 60 MG: 60 INJECTION SUBCUTANEOUS at 10:05

## 2020-05-14 ENCOUNTER — OFFICE VISIT (OUTPATIENT)
Dept: ENDOCRINOLOGY | Facility: CLINIC | Age: 75
End: 2020-05-14
Payer: MEDICARE

## 2020-05-14 VITALS
DIASTOLIC BLOOD PRESSURE: 60 MMHG | BODY MASS INDEX: 17.89 KG/M2 | SYSTOLIC BLOOD PRESSURE: 108 MMHG | HEART RATE: 70 BPM | WEIGHT: 101 LBS | HEIGHT: 63 IN | OXYGEN SATURATION: 99 %

## 2020-05-14 DIAGNOSIS — M81.0 OSTEOPOROSIS, UNSPECIFIED OSTEOPOROSIS TYPE, UNSPECIFIED PATHOLOGICAL FRACTURE PRESENCE: ICD-10-CM

## 2020-05-14 DIAGNOSIS — E04.2 MULTINODULAR GOITER: ICD-10-CM

## 2020-05-14 DIAGNOSIS — E27.40 ADRENAL INSUFFICIENCY: Primary | ICD-10-CM

## 2020-05-14 PROCEDURE — 99214 PR OFFICE/OUTPT VISIT, EST, LEVL IV, 30-39 MIN: ICD-10-PCS | Mod: S$GLB,,, | Performed by: INTERNAL MEDICINE

## 2020-05-14 PROCEDURE — 3074F PR MOST RECENT SYSTOLIC BLOOD PRESSURE < 130 MM HG: ICD-10-PCS | Mod: CPTII,S$GLB,, | Performed by: INTERNAL MEDICINE

## 2020-05-14 PROCEDURE — 99214 OFFICE O/P EST MOD 30 MIN: CPT | Mod: S$GLB,,, | Performed by: INTERNAL MEDICINE

## 2020-05-14 PROCEDURE — 3078F DIAST BP <80 MM HG: CPT | Mod: CPTII,S$GLB,, | Performed by: INTERNAL MEDICINE

## 2020-05-14 PROCEDURE — 99999 PR PBB SHADOW E&M-EST. PATIENT-LVL III: CPT | Mod: PBBFAC,,, | Performed by: INTERNAL MEDICINE

## 2020-05-14 PROCEDURE — 3074F SYST BP LT 130 MM HG: CPT | Mod: CPTII,S$GLB,, | Performed by: INTERNAL MEDICINE

## 2020-05-14 PROCEDURE — 1159F MED LIST DOCD IN RCRD: CPT | Mod: S$GLB,,, | Performed by: INTERNAL MEDICINE

## 2020-05-14 PROCEDURE — 3078F PR MOST RECENT DIASTOLIC BLOOD PRESSURE < 80 MM HG: ICD-10-PCS | Mod: CPTII,S$GLB,, | Performed by: INTERNAL MEDICINE

## 2020-05-14 PROCEDURE — 1101F PT FALLS ASSESS-DOCD LE1/YR: CPT | Mod: CPTII,S$GLB,, | Performed by: INTERNAL MEDICINE

## 2020-05-14 PROCEDURE — 1159F PR MEDICATION LIST DOCUMENTED IN MEDICAL RECORD: ICD-10-PCS | Mod: S$GLB,,, | Performed by: INTERNAL MEDICINE

## 2020-05-14 PROCEDURE — 1126F AMNT PAIN NOTED NONE PRSNT: CPT | Mod: S$GLB,,, | Performed by: INTERNAL MEDICINE

## 2020-05-14 PROCEDURE — 1126F PR PAIN SEVERITY QUANTIFIED, NO PAIN PRESENT: ICD-10-PCS | Mod: S$GLB,,, | Performed by: INTERNAL MEDICINE

## 2020-05-14 PROCEDURE — 99999 PR PBB SHADOW E&M-EST. PATIENT-LVL III: ICD-10-PCS | Mod: PBBFAC,,, | Performed by: INTERNAL MEDICINE

## 2020-05-14 PROCEDURE — 1101F PR PT FALLS ASSESS DOC 0-1 FALLS W/OUT INJ PAST YR: ICD-10-PCS | Mod: CPTII,S$GLB,, | Performed by: INTERNAL MEDICINE

## 2020-05-14 RX ORDER — PREDNISONE 5 MG/1
5 TABLET ORAL DAILY
Qty: 30 TABLET | Refills: 6
Start: 2020-05-14 | End: 2020-11-11 | Stop reason: SDUPTHER

## 2020-05-14 NOTE — PROGRESS NOTES
CHIEF COMPLAINT: Multinodular Goiter.   74 year old being seen as a f/u. She had a benign FNA 3/21/13. 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 She is seeing nephrology. Started on a trial of hydrocortisone for possible adrenal insufficiency (could not R/O based on cosyntropin stim test and having fatigue). On prednisone 7.5 currently. Taking Ca + D. Had a fracture of the pelvis Sept 2018 after a fall when walking to the bathroom. Received Prolia 5/12/20. NO N/V. No difficulty swallowing. No Fractures. No kidney stones.                PAST MEDICAL HISTORY: Asthma, WPW, depression/anxiety, hyperlipidemia.     PAST SURGICAL HISTORY: right knee replacement. Hand surgery.     SOCIAL HISTORY: No T/A     FAMILY HISTORY: No known thyroid disease or thyroid cancer. Dementia.     MEDICATIONS/ALLERGIES: The patient's MedCard has been updated and reviewed.     ROS:   Constitutional: weight stable  ENT: No dysphagia    Cardiovascular: No CP  Respiratory: SOB at baseline   Gastrointestinal: No nausea  GenitoUrinary - No dysuria   Skin: No new skin rash   Neurologic: No focal neurologic complaints   Remainder ROS negative         PE:   GENERAL: Well developed, well nourished.   NECK: Supple, trachea midline, Left nodule palpable. No LAD   CHEST: Resp even and unlabored, CTA bilateral.   CARDIAC: RRR, S1, S2 heard, no murmurs, rubs, S3, or S4       Results for KIMDOLLY MARIA A (MRN [id number]) as of 5/14/2020 11:03   Ref. Range 4/28/2020 12:30 5/8/2020 12:56 5/11/2020 08:25   Sodium Latest Ref Range: 136 - 145 mmol/L 136     Potassium Latest Ref Range: 3.5 - 5.1 mmol/L 3.7     Chloride Latest Ref Range: 95 - 110 mmol/L 106     CO2 Latest Ref Range: 22 - 31 mmol/L 24     Anion Gap Latest Ref Range: 8 - 16 mmol/L 6 (L)     BUN, Bld Latest Ref Range: 7 - 18 mg/dL 10     Creatinine Latest Ref Range: 0.50 - 1.40 mg/dL 0.77     eGFR if non African American Latest Ref Range: >60 mL/min/1.73 m^2 >60     eGFR if  Latest Ref  Range: >60 mL/min/1.73 m^2 >60     Glucose Latest Ref Range: 70 - 110 mg/dL 96     Calcium Latest Ref Range: 8.4 - 10.2 mg/dL 8.6     Alkaline Phosphatase Latest Ref Range: 38 - 145 U/L 32 (L)  33 (L)   PROTEIN TOTAL Latest Ref Range: 6.0 - 8.4 g/dL 6.4  6.2   Albumin Latest Ref Range: 3.5 - 5.2 g/dL 3.2 (L)  3.4 (L)   BILIRUBIN TOTAL Latest Ref Range: 0.2 - 1.3 mg/dL 0.2  0.3   Bilirubin, Direct Latest Ref Range: 0.0 - 0.3 mg/dL   <0.1   AST Latest Ref Range: 14 - 36 U/L 48 (H)  42 (H)   ALT Latest Ref Range: 0 - 35 U/L 24  29   Triglycerides Latest Ref Range: 30 - 150 mg/dL   41   Cholesterol Latest Ref Range: 120 - 199 mg/dL   184   HDL Latest Ref Range: 40 - 75 mg/dL   73   Hdl/Cholesterol Ratio Latest Ref Range: 20.0 - 50.0 %   39.7   LDL Cholesterol External Latest Ref Range: 63.0 - 159.0 mg/dL   102.8   Non-HDL Cholesterol Latest Units: mg/dL   111   Total Cholesterol/HDL Ratio Latest Ref Range: 2.0 - 5.0    2.5   TSH Latest Ref Range: 0.400 - 4.000 uIU/mL 2.770     SARS-CoV2 (COVID-19) Qualitative PCR Latest Ref Range: Not Detected   Not Detected          ASSESSMENT/PLAN:   1. Adrenal Insuffiency- Labs borderline for AI. Since having some symptoms that could be AI related decided to do a trial of steroids. Repeat testing could not rule out AI and symptoms improved with steroids. Will decrease prednisone back to 5 mg daily.     2. Multinodular goiter-S/P benign FNA of left nodule. Check US    3. SIADH- . Being followed by nephrology. Check Na with next labs.     4. Osteoporosis- has had a pelvis fracture. On Prolia. Discussed s/e. She had one episode of nausea but resolved.       FOLLOWUP  THyroid US  F/U 6 months with CMP, Vit D

## 2020-08-11 PROBLEM — I65.29 STENOSIS OF CAROTID ARTERY: Status: ACTIVE | Noted: 2020-08-11

## 2020-08-11 PROBLEM — M81.0 AGE-RELATED OSTEOPOROSIS WITHOUT CURRENT PATHOLOGICAL FRACTURE: Status: RESOLVED | Noted: 2019-07-26 | Resolved: 2020-08-11

## 2020-08-12 PROBLEM — R11.0 NAUSEA: Status: RESOLVED | Noted: 2019-01-09 | Resolved: 2020-08-12

## 2020-08-12 PROBLEM — J30.9 ALLERGIC RHINITIS: Status: ACTIVE | Noted: 2020-08-12

## 2020-08-12 PROBLEM — D63.1 ANEMIA, CHRONIC RENAL FAILURE: Status: RESOLVED | Noted: 2017-12-13 | Resolved: 2020-08-12

## 2020-08-12 PROBLEM — R63.6 UNDERWEIGHT: Status: ACTIVE | Noted: 2020-08-12

## 2020-08-12 PROBLEM — N18.9 ANEMIA, CHRONIC RENAL FAILURE: Status: RESOLVED | Noted: 2017-12-13 | Resolved: 2020-08-12

## 2020-08-12 PROBLEM — D50.0 ANEMIA DUE TO CHRONIC BLOOD LOSS: Status: RESOLVED | Noted: 2017-12-13 | Resolved: 2020-08-12

## 2020-08-12 PROBLEM — N28.1 RENAL CYST: Status: ACTIVE | Noted: 2020-08-12

## 2020-10-19 ENCOUNTER — TELEPHONE (OUTPATIENT)
Dept: OTOLARYNGOLOGY | Facility: CLINIC | Age: 75
End: 2020-10-19

## 2020-10-19 NOTE — TELEPHONE ENCOUNTER
----- Message from Tisha Clayton sent at 10/19/2020  2:40 PM CDT -----  Contact: patient  Type: Needs Medical Advice  Who Called:  patient  Symptoms (please be specific):    How long has patient had these symptoms:    Pharmacy name and phone #:    Best Call Back Number: [phone]  Additional Information: requesting a call back to schedule appt

## 2020-10-19 NOTE — TELEPHONE ENCOUNTER
Patient called to schedule appt for sinus infection with Dr. Ledezma. Appt was scheduled 10/28/2020 at 11:20am with Agata Ireland.

## 2020-10-27 ENCOUNTER — HOSPITAL ENCOUNTER (OUTPATIENT)
Dept: RADIOLOGY | Facility: HOSPITAL | Age: 75
Discharge: HOME | End: 2020-10-27
Attending: INTERNAL MEDICINE
Payer: MEDICARE

## 2020-10-27 DIAGNOSIS — M81.0 OSTEOPOROSIS, UNSPECIFIED OSTEOPOROSIS TYPE, UNSPECIFIED PATHOLOGICAL FRACTURE PRESENCE: ICD-10-CM

## 2020-10-27 DIAGNOSIS — E04.2 MULTINODULAR GOITER: ICD-10-CM

## 2020-10-27 PROCEDURE — 76536 US EXAM OF HEAD AND NECK: CPT | Mod: 26,,, | Performed by: RADIOLOGY

## 2020-10-27 PROCEDURE — 76536 US SOFT TISSUE HEAD NECK THYROID: ICD-10-PCS | Mod: 26,,, | Performed by: RADIOLOGY

## 2020-10-27 PROCEDURE — 76536 US EXAM OF HEAD AND NECK: CPT | Mod: TC,PO

## 2020-10-28 ENCOUNTER — OFFICE VISIT (OUTPATIENT)
Dept: OTOLARYNGOLOGY | Facility: CLINIC | Age: 75
End: 2020-10-28
Payer: MEDICARE

## 2020-10-28 VITALS — BODY MASS INDEX: 17.85 KG/M2 | WEIGHT: 100.75 LBS | HEIGHT: 63 IN

## 2020-10-28 DIAGNOSIS — K21.9 LPRD (LARYNGOPHARYNGEAL REFLUX DISEASE): ICD-10-CM

## 2020-10-28 DIAGNOSIS — J30.0 VMR (VASOMOTOR RHINITIS): Primary | ICD-10-CM

## 2020-10-28 DIAGNOSIS — R09.89 THROAT CLEARING: ICD-10-CM

## 2020-10-28 DIAGNOSIS — R09.A2 GLOBUS SENSATION: ICD-10-CM

## 2020-10-28 PROCEDURE — 1159F PR MEDICATION LIST DOCUMENTED IN MEDICAL RECORD: ICD-10-PCS | Mod: S$GLB,,, | Performed by: NURSE PRACTITIONER

## 2020-10-28 PROCEDURE — 99203 OFFICE O/P NEW LOW 30 MIN: CPT | Mod: 25,S$GLB,, | Performed by: NURSE PRACTITIONER

## 2020-10-28 PROCEDURE — 31575 PR LARYNGOSCOPY, FLEXIBLE; DIAGNOSTIC: ICD-10-PCS | Mod: S$GLB,,, | Performed by: NURSE PRACTITIONER

## 2020-10-28 PROCEDURE — 1159F MED LIST DOCD IN RCRD: CPT | Mod: S$GLB,,, | Performed by: NURSE PRACTITIONER

## 2020-10-28 PROCEDURE — 99999 PR PBB SHADOW E&M-EST. PATIENT-LVL V: CPT | Mod: PBBFAC,,, | Performed by: NURSE PRACTITIONER

## 2020-10-28 PROCEDURE — 99999 PR PBB SHADOW E&M-EST. PATIENT-LVL V: ICD-10-PCS | Mod: PBBFAC,,, | Performed by: NURSE PRACTITIONER

## 2020-10-28 PROCEDURE — 1126F AMNT PAIN NOTED NONE PRSNT: CPT | Mod: S$GLB,,, | Performed by: NURSE PRACTITIONER

## 2020-10-28 PROCEDURE — 1126F PR PAIN SEVERITY QUANTIFIED, NO PAIN PRESENT: ICD-10-PCS | Mod: S$GLB,,, | Performed by: NURSE PRACTITIONER

## 2020-10-28 PROCEDURE — 3008F PR BODY MASS INDEX (BMI) DOCUMENTED: ICD-10-PCS | Mod: CPTII,S$GLB,, | Performed by: NURSE PRACTITIONER

## 2020-10-28 PROCEDURE — 1101F PR PT FALLS ASSESS DOC 0-1 FALLS W/OUT INJ PAST YR: ICD-10-PCS | Mod: CPTII,S$GLB,, | Performed by: NURSE PRACTITIONER

## 2020-10-28 PROCEDURE — 99203 PR OFFICE/OUTPT VISIT, NEW, LEVL III, 30-44 MIN: ICD-10-PCS | Mod: 25,S$GLB,, | Performed by: NURSE PRACTITIONER

## 2020-10-28 PROCEDURE — 1101F PT FALLS ASSESS-DOCD LE1/YR: CPT | Mod: CPTII,S$GLB,, | Performed by: NURSE PRACTITIONER

## 2020-10-28 PROCEDURE — 3008F BODY MASS INDEX DOCD: CPT | Mod: CPTII,S$GLB,, | Performed by: NURSE PRACTITIONER

## 2020-10-28 PROCEDURE — 31575 DIAGNOSTIC LARYNGOSCOPY: CPT | Mod: S$GLB,,, | Performed by: NURSE PRACTITIONER

## 2020-10-28 RX ORDER — IPRATROPIUM BROMIDE 42 UG/1
2 SPRAY, METERED NASAL 3 TIMES DAILY
Qty: 15 ML | Refills: 12 | Status: SHIPPED | OUTPATIENT
Start: 2020-10-28 | End: 2021-10-28

## 2020-10-28 RX ORDER — CIPROFLOXACIN 250 MG/1
TABLET, FILM COATED ORAL
COMMUNITY
Start: 2020-10-20 | End: 2020-10-28 | Stop reason: ALTCHOICE

## 2020-10-28 RX ORDER — MOXIFLOXACIN 5 MG/ML
SOLUTION/ DROPS OPHTHALMIC
Status: ON HOLD | COMMUNITY
Start: 2020-10-15 | End: 2021-12-05 | Stop reason: HOSPADM

## 2020-10-28 RX ORDER — CEFDINIR 300 MG/1
CAPSULE ORAL
COMMUNITY
Start: 2020-08-31 | End: 2020-10-28 | Stop reason: ALTCHOICE

## 2020-10-28 NOTE — PATIENT INSTRUCTIONS
There are two types of chronically runny nose:     ALLERGIC: associated with itchy runny red eyes, itchy runny red nose, significant increase in sneezing, significant increase in stuffiness.  Antihistamines (Zyrtec / Claritin / Astelin), nasal sprays, and saline rinsing advised.  Avoid or limit exposure to known allergy triggers if possible.      NON-ALLERGIC: usually associated with eating spicy foods or stepping out into frigid cold air, but will increase with age, and starts to happen for no reason at all.  This one can sometimes worsen while eating.  Allergy medicines will not help. The only nasal spray that will help is Ipratropium or Atrovent. I increased your Atrovent nasal spray from 0.03% to 0.06%. You may use this up to 3 times daily to stop the nasal drip.       Some of the Top Considerations for Chronic Post-Nasal Drip:     1. Nasal allergies -- Typical constellation of symptoms seen with nasal allergies: itchy, red, watery eyes; itchy, red, watery nose; excessive sneezing; excessive stuffiness. If this one best describes your current state, then discuss with your primary care provider whether you should see an allergist or take daily allergy medications.     2. Sinus Infection -- Typical constellation of symptoms seen with acute bacterial sinus infection are:  Green-gold, foul-smelling, foul-tasting mucus from nose and throat, inability to breathe through nose, inability to smell or taste well, facial pain and swelling, dental pain, headaches around eyes, sore throat and productive cough. If this one best describes your current state, then let's get sinus imaging to rule out infection.     3.  Silent reflux -- Typical constellation of symptoms seen with silent reflux: post-nasal drip sensation with absence of significant runny nose or nasal congestion, sensation of thick or too much mucus in the back of throat, raspy voice, frequent throat clearing, lump in the back of throat, frequent sore throats.  "If this one best describes your current state, discuss with your primary care provider whether you should see a gastroenterologist or take daily reflux medications. Your GI doctor may want to do an Upper GI or obtain a barium swallow or pH monitoring test.       LARYNGOPHARYNGEAL REFLUX  (SILENT OR ATYPICAL REFLUX)    If you have any of the following symptoms you may have laryngopharyngeal reflux (LPR):  hoarseness, thick or too much mucus, chronic throat pain/irritation, chronic throat clearing, chronic cough, especially cough that wake you up from sleep, chronic "postnasal drip" without the need to blow your nose.     Many people with LPR do not have symptoms of heartburn. Compared to the esophagus, the voice box and the back of the throat are significantly more sensitive to the effects of acid on surrounding tissue. Acid passing quickly through the esophagus does not have a chance to irritate the area for too long.  However acid that pools in the throat or voice box can cause prolonged irritation resulting in the symptoms of LPR. In patients known to have LPR, 71% reported hoarseness, 51% reported chronic cough, 47% reported sensation of thickness or lump in the back of the throat, 42% reported chronic throat clearing, and 35% reported trouble swallowing.     Another major symptom of LPR is "postnasal drip."  Patients are often told symptoms are due to abnormal nasal drainage or sinus infection; however this is rarely the cause of chronic throat irritation. For post nasal drip to cause the complaints described, signs and symptoms of an active nasal infection should be present.     Treatments for LPR include:  postural changes, weight reduction, diet modification, medication to reduce stomach acid and promote normal motility, and surgery to prevent reflux. Most patients will begin to notice some relief in her symptoms about 2 weeks after starting the medication; however it is generally recommended the medication " "should be continued for 2 months. If the symptoms completely resolve, the medication can then be tapered.  Some people will remain symptom free while others may have relapses which required treatment again.    Things you can do to prevent reflux include:  Do not smoke.  Smoking will cause reflux.  Avoid tight fitting clothes or belts around the waist.  Avoid vigorous exercise at least 2 hours before bedtime. Avoid eating at least 2 hours prior to bedtime.  In fact avoid eating your largest meal at night.  Weight loss.  For patient's with recent weight gain, shedding a few pounds is all that is required to improve reflux.  Avoid caffeine, cola beverages, citrus beverages, mints, alcoholic beverages, particularly at night, cheese, fried foods, spicy foods, eggs, and chocolate.  Sleep with the head of bed elevated at least 6 inches ("MedCline" wedge pillow).    Recommendations:    Take Pantoprazole (PPI) twice daily on an empty stomach (30-60 minutes before eating) at 40 MG.   See a Gastro doctor (GI) for refractory symptoms and continued management.    "

## 2020-10-28 NOTE — PROGRESS NOTES
"Subjective:       Patient ID: Telma Fraser is a 75 y.o. female.    Chief Complaint: No chief complaint on file.    HPI   Patient is new to ENT, self-referred for "sinus infection" since January.  Patient has h/o two prior sinus surgeries: one in the 1960s while in college and the 2nd one done by Dr. Irvin Marino.  CT sinus done <2 months ago: "Surgical changes are noted from bilateral maxillary antrostomies and apparent partial ethmoidectomy.  There are no air-fluid levels or frothy opacities within the paranasal sinuses to indicate acute sinusitis.  There is minimal nonobstructing scattered mucoperiosteal thickening within the maxillary sinuses.  There is partial opacification of left ethmoid air cell.  Sphenoid sinuses are clear.  Right ethmoid air cells are clear.  The frontal sinuses are clear.  There is moderate mucoperiosteal thickening of the inferior nasal turbinates bilaterally could reflect rhinitis change.  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  Patient states she was given and completed 21-day course of Omnicef throughout most of September.      Patient states her present symptoms are: runny nose and runny right eye. She denies mucopus from nose, stating runny nose is clear and worsens at mealtimes. She occasionally expels small amounts of mucus from her throat. She denies excessive sneezing or stuffiness. Patient denies s/s of acute bacterial sinusitis:  No mucopus from nose or throat, no facial swelling/pain, no dental pain, no diminished olfaction/taste, no headaches around the eyes, no sore throat or productive cough. She reports sensation of excessive phlegm in the back of her throat despite a clear nose.     Review of Systems   Constitutional: Negative.  Negative for fever.   HENT: Positive for postnasal drip and rhinorrhea. Negative for nasal congestion, dental problem, facial swelling, sinus pressure/congestion, " sneezing and sore throat.    Eyes: Positive for discharge and itching.   Respiratory: Negative.  Negative for cough.    Cardiovascular: Negative.    Gastrointestinal: Negative.    Musculoskeletal: Negative.    Integumentary:  Negative.   Neurological: Negative.  Negative for headaches.   Hematological: Negative.    Psychiatric/Behavioral: Negative.          Objective:      Physical Exam  Vitals signs and nursing note reviewed.   Constitutional:       General: She is not in acute distress.     Appearance: She is well-developed. She is not ill-appearing or diaphoretic.   HENT:      Head: Normocephalic and atraumatic.      Right Ear: Hearing, tympanic membrane, ear canal and external ear normal. No middle ear effusion. Tympanic membrane is not erythematous.      Left Ear: Hearing, tympanic membrane, ear canal and external ear normal.  No middle ear effusion. Tympanic membrane is not erythematous.      Nose: Rhinorrhea (clear watery) present. No mucosal edema.      Right Sinus: No maxillary sinus tenderness or frontal sinus tenderness.      Left Sinus: Maxillary sinus tenderness (1-2/10) present. No frontal sinus tenderness.      Mouth/Throat:      Mouth: Mucous membranes are not pale, not dry and not cyanotic. No oral lesions.      Pharynx: Uvula midline. No oropharyngeal exudate or posterior oropharyngeal erythema.   Eyes:      General: Lids are normal. No scleral icterus.        Right eye: No discharge.         Left eye: No discharge.      Conjunctiva/sclera: Conjunctivae normal.      Pupils: Pupils are equal, round, and reactive to light.   Neck:      Musculoskeletal: Normal range of motion and neck supple.      Thyroid: No thyroid mass or thyromegaly.      Trachea: Trachea normal. No tracheal deviation.   Cardiovascular:      Rate and Rhythm: Normal rate.   Pulmonary:      Effort: Pulmonary effort is normal. No respiratory distress.      Breath sounds: No stridor. No wheezing.   Musculoskeletal: Normal range of  motion.   Lymphadenopathy:      Head:      Right side of head: No submental, submandibular, tonsillar, preauricular or posterior auricular adenopathy.      Left side of head: No submental, submandibular, tonsillar, preauricular or posterior auricular adenopathy.      Cervical: No cervical adenopathy.      Right cervical: No superficial or posterior cervical adenopathy.     Left cervical: No superficial or posterior cervical adenopathy.   Skin:     General: Skin is warm and dry.      Coloration: Skin is not pale.      Findings: No lesion or rash.   Neurological:      Mental Status: She is alert and oriented to person, place, and time.      Coordination: Coordination normal.      Gait: Gait normal.   Psychiatric:         Speech: Speech normal.         Behavior: Behavior normal. Behavior is cooperative.         Thought Content: Thought content normal.         Judgment: Judgment normal.       Procedure: Flexible laryngoscopy    In order to fully examine the upper aerodigestive tract, including the larynx, in a patient with a hyperactive gag reflex, and suboptimal visualization with indirect mirror exam,  flexible endoscopy is required.   After explaining the procedure and obtaining verbal consent, a timeout was performed with the patient's participation according to the universal protocol. Both nasal cavities were anesthetized with 4% Xylocaine spray mixed with Nabor-Synephrine. The flexible laryngoscope  was inserted into the nasal cavity and advanced to visualize the nasal cavity, nasopharynx, the posterior oropharynx, hypopharynx, and the endolarynx with the  findings noted. The scope was removed and the procedure terminated. The patient tolerated this procedure well without apparent complication.     OVERALL FINDINGS  Nasopharynx - the torus is clear. There are no lesions of the posterior wall.   Oropharynx - no lesions of the tongue base. There is no obvious fullness or asymmetry.  Hypopharynx - there are no lesions  of the pyriform sinuses or postcricoid region   Larynx - there are no lesions of the supraglottic or glottic larynx.  Vocal fold mobility is normal.     SPECIFIC FINDINGS  Adenoid tissue - normal   Nasopharynx & eustachian tube orifices - normal   Posterior pharyngeal wall - normal   Base of tongue - normal   Epiglottis - normal   Valleculae - normal   Pyriform sinuses - normal   False vocal cords - normal   True vocal cords - normal  Arytenoids - normal   Interarytenoid space - erythema, edema  Left middle valve    Left nasal cavity    Left Choana    Left sphenoid    Right nasal valve    Right maxillary antrostomy window    Right nasal cavity    Right Choana    Right ethmoid    Larynx    Larynx    Assessment:     Chronic vasomotor/non-allergic rhinitis    LPRD manifested as globus sensation and throat clearing  Plan:     Regarding the partial opacification of a left posterior ethmoid air cell, it would be ideal to have access to an older CT with which to compare as it is likely a chronic finding.   Reviewed CT findings in detail with patient on monitor. Reassured no evidence of acute or chronic sinusitis on imaging, and discussed no mucopurulence with which to obtain a specimen for culture today during bilateral nasoendoscopy.     Lengthy discussion regarding allergic versus non-allergic rhinitis. Discontinue Astelin. Increase Atrovent from 0.03% to 0.06%, may use up to 3 times daily. Would be particularly beneficial to use 30-40 minutes before mealtimes.  Advised/Cautioned: The results of today's ENT exam and flexible endoscopy were detailed to the patient and all questions were answered. Patient education centered around GERD, known exacerbants and contemporary treatment options. Laryngoscope video was reviewed with the patient in detail. Handouts given on LPRD and GERD were given to the patient. After review of these, patient will continue her pantoprazole 40 mg BID on an empty stomach. I encouraged the patient  once she has completed the evening meal to not snack or consume any other food products or caffeinated beverages for at least  minutes before retiring. Finally, I encouraged the patient to sleep about 30 degrees above horizontal, and this can be facilitated by using 2-3 pillows or a wedge foam product. If the patient is not demonstrably improved in 6-8 weeks, consultation with gastroenterology may be indicated to rule out intrinsic disease in the lower esophagus, stomach, or proximal duodenum.

## 2020-11-02 ENCOUNTER — OFFICE VISIT (OUTPATIENT)
Dept: ENDOCRINOLOGY | Facility: CLINIC | Age: 75
End: 2020-11-02
Payer: MEDICARE

## 2020-11-02 VITALS
SYSTOLIC BLOOD PRESSURE: 108 MMHG | HEART RATE: 58 BPM | OXYGEN SATURATION: 99 % | HEIGHT: 63 IN | BODY MASS INDEX: 17.81 KG/M2 | WEIGHT: 100.5 LBS | DIASTOLIC BLOOD PRESSURE: 60 MMHG

## 2020-11-02 DIAGNOSIS — M81.0 OSTEOPOROSIS, UNSPECIFIED OSTEOPOROSIS TYPE, UNSPECIFIED PATHOLOGICAL FRACTURE PRESENCE: ICD-10-CM

## 2020-11-02 DIAGNOSIS — E22.2 SIADH (SYNDROME OF INAPPROPRIATE ADH PRODUCTION): ICD-10-CM

## 2020-11-02 DIAGNOSIS — E04.2 MULTINODULAR GOITER: Primary | ICD-10-CM

## 2020-11-02 DIAGNOSIS — E27.40 ADRENAL INSUFFICIENCY: ICD-10-CM

## 2020-11-02 PROCEDURE — 3078F DIAST BP <80 MM HG: CPT | Mod: CPTII,S$GLB,, | Performed by: INTERNAL MEDICINE

## 2020-11-02 PROCEDURE — 99999 PR PBB SHADOW E&M-EST. PATIENT-LVL V: ICD-10-PCS | Mod: PBBFAC,,, | Performed by: INTERNAL MEDICINE

## 2020-11-02 PROCEDURE — 1159F MED LIST DOCD IN RCRD: CPT | Mod: S$GLB,,, | Performed by: INTERNAL MEDICINE

## 2020-11-02 PROCEDURE — 3078F PR MOST RECENT DIASTOLIC BLOOD PRESSURE < 80 MM HG: ICD-10-PCS | Mod: CPTII,S$GLB,, | Performed by: INTERNAL MEDICINE

## 2020-11-02 PROCEDURE — 1126F PR PAIN SEVERITY QUANTIFIED, NO PAIN PRESENT: ICD-10-PCS | Mod: S$GLB,,, | Performed by: INTERNAL MEDICINE

## 2020-11-02 PROCEDURE — 99214 OFFICE O/P EST MOD 30 MIN: CPT | Mod: S$GLB,,, | Performed by: INTERNAL MEDICINE

## 2020-11-02 PROCEDURE — 3074F SYST BP LT 130 MM HG: CPT | Mod: CPTII,S$GLB,, | Performed by: INTERNAL MEDICINE

## 2020-11-02 PROCEDURE — 3074F PR MOST RECENT SYSTOLIC BLOOD PRESSURE < 130 MM HG: ICD-10-PCS | Mod: CPTII,S$GLB,, | Performed by: INTERNAL MEDICINE

## 2020-11-02 PROCEDURE — 3008F PR BODY MASS INDEX (BMI) DOCUMENTED: ICD-10-PCS | Mod: CPTII,S$GLB,, | Performed by: INTERNAL MEDICINE

## 2020-11-02 PROCEDURE — 99999 PR PBB SHADOW E&M-EST. PATIENT-LVL V: CPT | Mod: PBBFAC,,, | Performed by: INTERNAL MEDICINE

## 2020-11-02 PROCEDURE — 99214 PR OFFICE/OUTPT VISIT, EST, LEVL IV, 30-39 MIN: ICD-10-PCS | Mod: S$GLB,,, | Performed by: INTERNAL MEDICINE

## 2020-11-02 PROCEDURE — 1159F PR MEDICATION LIST DOCUMENTED IN MEDICAL RECORD: ICD-10-PCS | Mod: S$GLB,,, | Performed by: INTERNAL MEDICINE

## 2020-11-02 PROCEDURE — 1101F PT FALLS ASSESS-DOCD LE1/YR: CPT | Mod: CPTII,S$GLB,, | Performed by: INTERNAL MEDICINE

## 2020-11-02 PROCEDURE — 3008F BODY MASS INDEX DOCD: CPT | Mod: CPTII,S$GLB,, | Performed by: INTERNAL MEDICINE

## 2020-11-02 PROCEDURE — 1126F AMNT PAIN NOTED NONE PRSNT: CPT | Mod: S$GLB,,, | Performed by: INTERNAL MEDICINE

## 2020-11-02 PROCEDURE — 1101F PR PT FALLS ASSESS DOC 0-1 FALLS W/OUT INJ PAST YR: ICD-10-PCS | Mod: CPTII,S$GLB,, | Performed by: INTERNAL MEDICINE

## 2020-11-02 NOTE — PROGRESS NOTES
CHIEF COMPLAINT: Multinodular Goiter.   75 y.o.  being seen as a f/u. She had a benign FNA 3/21/13. 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 She is seeing nephrology. Started on a trial of hydrocortisone for possible adrenal insufficiency (could not R/O based on cosyntropin stim test and having fatigue). On prednisone 7.5 currently. Taking Ca + D. Had a fracture of the pelvis Sept 2018 after a fall when walking to the bathroom. Prolia scheduled 11/12/20. No difficulty swallowing.no change in voice. No palpitations. No tremors. No falls. No fractures. No N/V.                 PAST MEDICAL HISTORY: Asthma, WPW, depression/anxiety, hyperlipidemia.     PAST SURGICAL HISTORY: right knee replacement. Hand surgery.     SOCIAL HISTORY: No T/A     FAMILY HISTORY: No known thyroid disease or thyroid cancer. Dementia.     MEDICATIONS/ALLERGIES: The patient's MedCard has been updated and reviewed.     ROS:   Constitutional: weight stable  ENT: No dysphagia    Cardiovascular: No CP  Respiratory: SOB at baseline   Gastrointestinal: No nausea  GenitoUrinary - No dysuria   Skin: No new skin rash   Neurologic: No focal neurologic complaints   Remainder ROS negative         PE:   GENERAL: Well developed, well nourished.   NECK: Supple, trachea midline, Left nodule palpable. No LAD   CHEST: Resp even and unlabored, CTA bilateral.   CARDIAC: RRR, S1, S2 heard, no murmurs, rubs, S3, or S4       Results for MARIA A VALDES (MRN [id number]) as of 11/2/2020 14:44   Ref. Range 10/27/2020 11:57   Sodium Latest Ref Range: 136 - 145 mmol/L 135 (L)   Potassium Latest Ref Range: 3.5 - 5.1 mmol/L 3.6   Chloride Latest Ref Range: 95 - 110 mmol/L 102   CO2 Latest Ref Range: 23 - 29 mmol/L 26   Anion Gap Latest Ref Range: 8 - 16 mmol/L 7 (L)   BUN Latest Ref Range: 8 - 23 mg/dL 10   Creatinine Latest Ref Range: 0.5 - 1.4 mg/dL 1.0   eGFR if non African American Latest Ref Range: >60 mL/min/1.73 m^2 55.6 (A)   eGFR if African American Latest Ref Range:  >60 mL/min/1.73 m^2 >60.0   Glucose Latest Ref Range: 70 - 110 mg/dL 82   Calcium Latest Ref Range: 8.7 - 10.5 mg/dL 8.4 (L)   Alkaline Phosphatase Latest Ref Range: 55 - 135 U/L 31 (L)   PROTEIN TOTAL Latest Ref Range: 6.0 - 8.4 g/dL 6.5   Albumin Latest Ref Range: 3.5 - 5.2 g/dL 3.2 (L)   BILIRUBIN TOTAL Latest Ref Range: 0.1 - 1.0 mg/dL 0.3   AST Latest Ref Range: 10 - 40 U/L 25   ALT Latest Ref Range: 10 - 44 U/L 9 (L)   Vit D, 25-Hydroxy Latest Ref Range: 30 - 96 ng/mL 45     Corrected Ca: 9.04      US SOFT TISSUE HEAD NECK THYROID     CLINICAL HISTORY:  Nontoxic multinodular goiter     TECHNIQUE:  Ultrasound of the thyroid and cervical lymph nodes was performed.     COMPARISON:  Thyroid ultrasound-03/18/2019     FINDINGS:  The right thyroid lobe measures 3.6 x 1.3 x 1.3 cm.  The left thyroid lobe measures 4.4 x 1.5 x 1.4 cm.  The total thyroid weight measures approximately 8 g.  There is a diffusely heterogeneous thyroid parenchymal echotexture present.  There is a 14 x 13 x 14 mm smooth, circumscribed, wider than tall, isoechoic solid nodule with internal macrocalcifications observed at the junction of the mid and lower pole of the left thyroid lobe, similar to the previous examination.  There is a 10 x 9 x 12 mm smooth, circumscribed, wider than tall, hypoechoic solid nodule with internal microcalcifications present posteriorly in the left thyroid midpole, similar to the previous examination.  There is a 7 x 9 x 9 mm smooth, circumscribed, wider than tall, hypoechoic solid nodule with internal microcalcifications and macrocalcifications noted in the lower pole of the left thyroid lobe, similar to the prior exam.  There are multiple solid nodules in the right thyroid lobe, the largest of which measures 14 x 7 x 10 mm.  This nodule is smooth, circumscribed, wider than tall, hypoechoic, and solid and is situated at the junction of the right thyroid midpole and right aspect of the thyroid isthmus, similar to the  prior exam.  There are a few clustered solid hypoechoic nodules measuring 5-6 mm in the lower pole of the right thyroid lobe, similar to the prior exam.  No new concerning thyroid nodules which meet criteria for FNA/core biopsy.  No pathologically enlarged lymph nodes in the left or right neck adjacent to the thyroid fossa.  There is increased color flow present throughout the thyroid gland, left greater than right.     Impression:     No significant interval detrimental change when compared to the prior examination.  Small, heterogeneous, multinodular thyroid gland with stable solid nodules scattered throughout.  No new thyroid nodules which meet the criteria for FNA/core biopsy.  The previously noted nodules are all stable in number and size as compared to the prior exam.         ASSESSMENT/PLAN:   1. Adrenal Insuffiency- Labs borderline for AI. Since having some symptoms that could be AI related decided to do a trial of steroids. Repeat testing could not rule out AI and symptoms improved with steroids. Continue steroids. Discussed sick day precautions.     2. Multinodular goiter-S/P benign FNA of left nodule.US shows no changes from before. Denies obstructive symptoms. Check Annually    3. SIADH- . Being followed by nephrology. Na mildly decreased. Can continue to monitor.     4. Osteoporosis- has had a pelvis fracture. On Prolia. Discussed s/e. Taking Ca + D. Discussed fall precautions.     FOLLOWUP  F/U 6 months with CMP, Vit D, ionized Ca.

## 2020-11-11 RX ORDER — PREDNISONE 5 MG/1
5 TABLET ORAL DAILY
Qty: 90 TABLET | Refills: 3 | Status: SHIPPED | OUTPATIENT
Start: 2020-11-11 | End: 2021-09-24

## 2020-11-11 NOTE — TELEPHONE ENCOUNTER
----- Message from David Bartlett sent at 11/11/2020  1:51 PM CST -----  Type:  RX Refill Request    Who Called: Patient  Refill or New Rx:  Refill  RX Name and Strength:  predniSONE (DELTASONE) 5 MG tablet  How is the patient currently taking it? (ex. 1XDay):  1XDay  Is this a 30 day or 90 day RX:  90    Preferred Pharmacy with phone number:   [facility] - [place], [place] - [address]  [address] [address]  Phone: [phone] Fax: [phone]        Local or Mail Order: Mail Order  Ordering Provider:  Omar Mccray Call Back Number:  [phone]  Additional Information:

## 2020-11-12 ENCOUNTER — INFUSION (OUTPATIENT)
Dept: INFUSION THERAPY | Facility: HOSPITAL | Age: 75
End: 2020-11-12
Attending: INTERNAL MEDICINE
Payer: MEDICARE

## 2020-11-12 VITALS
SYSTOLIC BLOOD PRESSURE: 88 MMHG | DIASTOLIC BLOOD PRESSURE: 62 MMHG | RESPIRATION RATE: 16 BRPM | TEMPERATURE: 98 F | HEART RATE: 79 BPM

## 2020-11-12 DIAGNOSIS — M81.8 OTHER OSTEOPOROSIS WITHOUT CURRENT PATHOLOGICAL FRACTURE: Primary | ICD-10-CM

## 2020-11-12 PROCEDURE — 63600175 PHARM REV CODE 636 W HCPCS: Mod: JG,PN | Performed by: INTERNAL MEDICINE

## 2020-11-12 PROCEDURE — 96372 THER/PROPH/DIAG INJ SC/IM: CPT | Mod: PN

## 2020-11-12 RX ORDER — DIPHENHYDRAMINE HCL 25 MG
25 CAPSULE ORAL
Status: CANCELLED | OUTPATIENT
Start: 2020-11-18

## 2020-11-12 RX ORDER — ACETAMINOPHEN 500 MG
1000 TABLET ORAL
Status: CANCELLED | OUTPATIENT
Start: 2020-11-18

## 2020-11-12 RX ADMIN — DENOSUMAB 60 MG: 60 INJECTION SUBCUTANEOUS at 10:11

## 2021-01-15 ENCOUNTER — IMMUNIZATION (OUTPATIENT)
Dept: FAMILY MEDICINE | Facility: CLINIC | Age: 76
End: 2021-01-15
Payer: MEDICARE

## 2021-01-15 DIAGNOSIS — Z23 NEED FOR VACCINATION: Primary | ICD-10-CM

## 2021-01-15 PROCEDURE — 91300 COVID-19, MRNA, LNP-S, PF, 30 MCG/0.3 ML DOSE VACCINE: CPT | Mod: PBBFAC | Performed by: NURSE PRACTITIONER

## 2021-02-05 ENCOUNTER — IMMUNIZATION (OUTPATIENT)
Dept: FAMILY MEDICINE | Facility: CLINIC | Age: 76
End: 2021-02-05
Payer: MEDICARE

## 2021-02-05 DIAGNOSIS — Z23 NEED FOR VACCINATION: Primary | ICD-10-CM

## 2021-02-05 PROCEDURE — 0002A COVID-19, MRNA, LNP-S, PF, 30 MCG/0.3 ML DOSE VACCINE: ICD-10-PCS | Mod: CV19,,, | Performed by: FAMILY MEDICINE

## 2021-02-05 PROCEDURE — 0002A COVID-19, MRNA, LNP-S, PF, 30 MCG/0.3 ML DOSE VACCINE: CPT | Mod: CV19,,, | Performed by: FAMILY MEDICINE

## 2021-02-05 PROCEDURE — 91300 COVID-19, MRNA, LNP-S, PF, 30 MCG/0.3 ML DOSE VACCINE: CPT | Mod: ,,, | Performed by: FAMILY MEDICINE

## 2021-02-05 PROCEDURE — 91300 COVID-19, MRNA, LNP-S, PF, 30 MCG/0.3 ML DOSE VACCINE: ICD-10-PCS | Mod: ,,, | Performed by: FAMILY MEDICINE

## 2021-05-06 ENCOUNTER — LAB VISIT (OUTPATIENT)
Dept: LAB | Facility: HOSPITAL | Age: 76
End: 2021-05-06
Attending: INTERNAL MEDICINE
Payer: MEDICARE

## 2021-05-06 ENCOUNTER — TELEPHONE (OUTPATIENT)
Dept: ENDOCRINOLOGY | Facility: CLINIC | Age: 76
End: 2021-05-06

## 2021-05-06 DIAGNOSIS — E22.2 SIADH (SYNDROME OF INAPPROPRIATE ADH PRODUCTION): ICD-10-CM

## 2021-05-06 DIAGNOSIS — E04.2 MULTINODULAR GOITER: ICD-10-CM

## 2021-05-06 DIAGNOSIS — E27.40 ADRENAL INSUFFICIENCY: ICD-10-CM

## 2021-05-06 DIAGNOSIS — M81.0 OSTEOPOROSIS, UNSPECIFIED OSTEOPOROSIS TYPE, UNSPECIFIED PATHOLOGICAL FRACTURE PRESENCE: ICD-10-CM

## 2021-05-06 LAB
25(OH)D3+25(OH)D2 SERPL-MCNC: 61 NG/ML (ref 30–96)
ALBUMIN SERPL BCP-MCNC: 3.2 G/DL (ref 3.5–5.2)
ALP SERPL-CCNC: 39 U/L (ref 55–135)
ALT SERPL W/O P-5'-P-CCNC: 16 U/L (ref 10–44)
ANION GAP SERPL CALC-SCNC: 7 MMOL/L (ref 8–16)
AST SERPL-CCNC: 36 U/L (ref 10–40)
BILIRUB SERPL-MCNC: 0.3 MG/DL (ref 0.1–1)
BUN SERPL-MCNC: 9 MG/DL (ref 8–23)
CA-I BLDV-SCNC: 1.21 MMOL/L (ref 1.06–1.42)
CALCIUM SERPL-MCNC: 8.5 MG/DL (ref 8.7–10.5)
CHLORIDE SERPL-SCNC: 101 MMOL/L (ref 95–110)
CO2 SERPL-SCNC: 21 MMOL/L (ref 23–29)
CREAT SERPL-MCNC: 0.9 MG/DL (ref 0.5–1.4)
EST. GFR  (AFRICAN AMERICAN): >60 ML/MIN/1.73 M^2
EST. GFR  (NON AFRICAN AMERICAN): >60 ML/MIN/1.73 M^2
GLUCOSE SERPL-MCNC: 85 MG/DL (ref 70–110)
POTASSIUM SERPL-SCNC: 3.9 MMOL/L (ref 3.5–5.1)
PROT SERPL-MCNC: 6.1 G/DL (ref 6–8.4)
SODIUM SERPL-SCNC: 129 MMOL/L (ref 136–145)

## 2021-05-06 PROCEDURE — 36415 COLL VENOUS BLD VENIPUNCTURE: CPT | Mod: PO | Performed by: INTERNAL MEDICINE

## 2021-05-06 PROCEDURE — 80053 COMPREHEN METABOLIC PANEL: CPT | Performed by: INTERNAL MEDICINE

## 2021-05-06 PROCEDURE — 82330 ASSAY OF CALCIUM: CPT | Performed by: INTERNAL MEDICINE

## 2021-05-06 PROCEDURE — 82306 VITAMIN D 25 HYDROXY: CPT | Performed by: INTERNAL MEDICINE

## 2021-05-07 PROBLEM — N18.31 STAGE 3A CHRONIC KIDNEY DISEASE: Status: ACTIVE | Noted: 2021-05-07

## 2021-05-07 PROBLEM — M46.1 SI (SACROILIAC) JOINT INFLAMMATION: Status: ACTIVE | Noted: 2021-05-07

## 2021-05-07 NOTE — TELEPHONE ENCOUNTER
Pt verbalized understanding. States she did talk with Nephrology about Na and they advised she takes 2 na tabs a day (for 5 days was told take an additional 5 tablets daily)  States she will let them know her current level as of yesterday

## 2021-05-07 NOTE — TELEPHONE ENCOUNTER
Corrected Ca 9.1. Prolia orders placed  Her Na is lower than before. Verify still following up with nephrology for Na.

## 2021-05-10 ENCOUNTER — OFFICE VISIT (OUTPATIENT)
Dept: ENDOCRINOLOGY | Facility: CLINIC | Age: 76
End: 2021-05-10
Payer: MEDICARE

## 2021-05-10 ENCOUNTER — LAB VISIT (OUTPATIENT)
Dept: LAB | Facility: HOSPITAL | Age: 76
End: 2021-05-10
Attending: INTERNAL MEDICINE
Payer: MEDICARE

## 2021-05-10 VITALS
HEART RATE: 59 BPM | HEIGHT: 64 IN | WEIGHT: 97.56 LBS | OXYGEN SATURATION: 96 % | SYSTOLIC BLOOD PRESSURE: 106 MMHG | DIASTOLIC BLOOD PRESSURE: 60 MMHG | BODY MASS INDEX: 16.65 KG/M2

## 2021-05-10 DIAGNOSIS — R82.90 ABNORMAL URINE ODOR: Primary | ICD-10-CM

## 2021-05-10 DIAGNOSIS — E27.40 ADRENAL INSUFFICIENCY: Primary | ICD-10-CM

## 2021-05-10 DIAGNOSIS — M81.0 OSTEOPOROSIS, UNSPECIFIED OSTEOPOROSIS TYPE, UNSPECIFIED PATHOLOGICAL FRACTURE PRESENCE: ICD-10-CM

## 2021-05-10 DIAGNOSIS — R82.90 ABNORMAL URINE ODOR: ICD-10-CM

## 2021-05-10 DIAGNOSIS — E22.2 SIADH (SYNDROME OF INAPPROPRIATE ADH PRODUCTION): Primary | ICD-10-CM

## 2021-05-10 DIAGNOSIS — E04.2 MULTINODULAR GOITER: ICD-10-CM

## 2021-05-10 LAB
BACTERIA #/AREA URNS HPF: ABNORMAL /HPF
BILIRUB UR QL STRIP: NEGATIVE
CAOX CRY URNS QL MICRO: ABNORMAL
CLARITY UR: ABNORMAL
COLOR UR: YELLOW
GLUCOSE UR QL STRIP: NEGATIVE
HGB UR QL STRIP: ABNORMAL
KETONES UR QL STRIP: NEGATIVE
LEUKOCYTE ESTERASE UR QL STRIP: ABNORMAL
MICROSCOPIC COMMENT: ABNORMAL
NITRITE UR QL STRIP: NEGATIVE
PH UR STRIP: 6 [PH] (ref 5–8)
PROT UR QL STRIP: NEGATIVE
RBC #/AREA URNS HPF: 0 /HPF (ref 0–4)
SP GR UR STRIP: >=1.03 (ref 1–1.03)
SQUAMOUS #/AREA URNS HPF: 1 /HPF
URN SPEC COLLECT METH UR: ABNORMAL
WBC #/AREA URNS HPF: ABNORMAL /HPF (ref 0–5)

## 2021-05-10 PROCEDURE — 87088 URINE BACTERIA CULTURE: CPT | Performed by: INTERNAL MEDICINE

## 2021-05-10 PROCEDURE — 87077 CULTURE AEROBIC IDENTIFY: CPT | Performed by: INTERNAL MEDICINE

## 2021-05-10 PROCEDURE — 99214 PR OFFICE/OUTPT VISIT, EST, LEVL IV, 30-39 MIN: ICD-10-PCS | Mod: S$GLB,,, | Performed by: INTERNAL MEDICINE

## 2021-05-10 PROCEDURE — 1159F MED LIST DOCD IN RCRD: CPT | Mod: S$GLB,,, | Performed by: INTERNAL MEDICINE

## 2021-05-10 PROCEDURE — 99999 PR PBB SHADOW E&M-EST. PATIENT-LVL V: CPT | Mod: PBBFAC,,, | Performed by: INTERNAL MEDICINE

## 2021-05-10 PROCEDURE — 87086 URINE CULTURE/COLONY COUNT: CPT | Performed by: INTERNAL MEDICINE

## 2021-05-10 PROCEDURE — 3008F BODY MASS INDEX DOCD: CPT | Mod: CPTII,S$GLB,, | Performed by: INTERNAL MEDICINE

## 2021-05-10 PROCEDURE — 1126F AMNT PAIN NOTED NONE PRSNT: CPT | Mod: S$GLB,,, | Performed by: INTERNAL MEDICINE

## 2021-05-10 PROCEDURE — 1101F PT FALLS ASSESS-DOCD LE1/YR: CPT | Mod: CPTII,S$GLB,, | Performed by: INTERNAL MEDICINE

## 2021-05-10 PROCEDURE — 1159F PR MEDICATION LIST DOCUMENTED IN MEDICAL RECORD: ICD-10-PCS | Mod: S$GLB,,, | Performed by: INTERNAL MEDICINE

## 2021-05-10 PROCEDURE — 3288F FALL RISK ASSESSMENT DOCD: CPT | Mod: CPTII,S$GLB,, | Performed by: INTERNAL MEDICINE

## 2021-05-10 PROCEDURE — 99999 PR PBB SHADOW E&M-EST. PATIENT-LVL V: ICD-10-PCS | Mod: PBBFAC,,, | Performed by: INTERNAL MEDICINE

## 2021-05-10 PROCEDURE — 3008F PR BODY MASS INDEX (BMI) DOCUMENTED: ICD-10-PCS | Mod: CPTII,S$GLB,, | Performed by: INTERNAL MEDICINE

## 2021-05-10 PROCEDURE — 1101F PR PT FALLS ASSESS DOC 0-1 FALLS W/OUT INJ PAST YR: ICD-10-PCS | Mod: CPTII,S$GLB,, | Performed by: INTERNAL MEDICINE

## 2021-05-10 PROCEDURE — 3288F PR FALLS RISK ASSESSMENT DOCUMENTED: ICD-10-PCS | Mod: CPTII,S$GLB,, | Performed by: INTERNAL MEDICINE

## 2021-05-10 PROCEDURE — 99214 OFFICE O/P EST MOD 30 MIN: CPT | Mod: S$GLB,,, | Performed by: INTERNAL MEDICINE

## 2021-05-10 PROCEDURE — 1126F PR PAIN SEVERITY QUANTIFIED, NO PAIN PRESENT: ICD-10-PCS | Mod: S$GLB,,, | Performed by: INTERNAL MEDICINE

## 2021-05-10 PROCEDURE — 87186 SC STD MICRODIL/AGAR DIL: CPT | Performed by: INTERNAL MEDICINE

## 2021-05-10 PROCEDURE — 81000 URINALYSIS NONAUTO W/SCOPE: CPT | Mod: PO | Performed by: INTERNAL MEDICINE

## 2021-05-11 ENCOUNTER — TELEPHONE (OUTPATIENT)
Dept: ENDOCRINOLOGY | Facility: CLINIC | Age: 76
End: 2021-05-11

## 2021-05-12 ENCOUNTER — INFUSION (OUTPATIENT)
Dept: INFUSION THERAPY | Facility: HOSPITAL | Age: 76
End: 2021-05-12
Attending: INTERNAL MEDICINE
Payer: MEDICARE

## 2021-05-12 VITALS
HEART RATE: 64 BPM | RESPIRATION RATE: 18 BRPM | DIASTOLIC BLOOD PRESSURE: 62 MMHG | SYSTOLIC BLOOD PRESSURE: 91 MMHG | TEMPERATURE: 97 F

## 2021-05-12 DIAGNOSIS — M81.8 OTHER OSTEOPOROSIS WITHOUT CURRENT PATHOLOGICAL FRACTURE: Primary | ICD-10-CM

## 2021-05-12 LAB — BACTERIA UR CULT: ABNORMAL

## 2021-05-12 PROCEDURE — 96372 THER/PROPH/DIAG INJ SC/IM: CPT | Mod: PN

## 2021-05-12 PROCEDURE — 63600175 PHARM REV CODE 636 W HCPCS: Mod: JG,PN | Performed by: INTERNAL MEDICINE

## 2021-05-12 RX ORDER — DIPHENHYDRAMINE HCL 25 MG
25 CAPSULE ORAL
Status: CANCELLED | OUTPATIENT
Start: 2021-06-09 | End: 2021-06-09

## 2021-05-12 RX ORDER — ACETAMINOPHEN 500 MG
1000 TABLET ORAL
Status: CANCELLED | OUTPATIENT
Start: 2021-06-09 | End: 2021-06-09

## 2021-05-12 RX ADMIN — DENOSUMAB 60 MG: 60 INJECTION SUBCUTANEOUS at 10:05

## 2021-10-27 ENCOUNTER — HOSPITAL ENCOUNTER (OUTPATIENT)
Dept: RADIOLOGY | Facility: HOSPITAL | Age: 76
Discharge: HOME | End: 2021-10-27
Attending: INTERNAL MEDICINE
Payer: MEDICARE

## 2021-10-27 DIAGNOSIS — E27.40 ADRENAL INSUFFICIENCY: ICD-10-CM

## 2021-10-27 DIAGNOSIS — R82.90 ABNORMAL URINE ODOR: ICD-10-CM

## 2021-10-27 DIAGNOSIS — E04.2 MULTINODULAR GOITER: ICD-10-CM

## 2021-10-27 DIAGNOSIS — M81.0 OSTEOPOROSIS, UNSPECIFIED OSTEOPOROSIS TYPE, UNSPECIFIED PATHOLOGICAL FRACTURE PRESENCE: ICD-10-CM

## 2021-10-27 PROCEDURE — 77080 DXA BONE DENSITY AXIAL: CPT | Mod: 26,,, | Performed by: RADIOLOGY

## 2021-10-27 PROCEDURE — 77080 DXA BONE DENSITY AXIAL: CPT | Mod: TC,PO

## 2021-10-27 PROCEDURE — 76536 US EXAM OF HEAD AND NECK: CPT | Mod: TC,PO

## 2021-10-27 PROCEDURE — 76536 US EXAM OF HEAD AND NECK: CPT | Mod: 26,,, | Performed by: RADIOLOGY

## 2021-10-27 PROCEDURE — 76536 US SOFT TISSUE HEAD NECK THYROID: ICD-10-PCS | Mod: 26,,, | Performed by: RADIOLOGY

## 2021-10-27 PROCEDURE — 77080 DEXA BONE DENSITY SPINE HIP: ICD-10-PCS | Mod: 26,,, | Performed by: RADIOLOGY

## 2021-11-01 ENCOUNTER — TELEPHONE (OUTPATIENT)
Dept: ENDOCRINOLOGY | Facility: CLINIC | Age: 76
End: 2021-11-01
Payer: MEDICARE

## 2021-11-01 DIAGNOSIS — E22.2 SIADH (SYNDROME OF INAPPROPRIATE ADH PRODUCTION): Primary | ICD-10-CM

## 2021-11-12 ENCOUNTER — TELEPHONE (OUTPATIENT)
Dept: INFUSION THERAPY | Facility: HOSPITAL | Age: 76
End: 2021-11-12
Payer: MEDICARE

## 2021-11-12 ENCOUNTER — INFUSION (OUTPATIENT)
Dept: INFUSION THERAPY | Facility: HOSPITAL | Age: 76
End: 2021-11-12
Attending: INTERNAL MEDICINE
Payer: MEDICARE

## 2021-11-12 VITALS
SYSTOLIC BLOOD PRESSURE: 108 MMHG | TEMPERATURE: 98 F | DIASTOLIC BLOOD PRESSURE: 64 MMHG | HEART RATE: 57 BPM | RESPIRATION RATE: 18 BRPM

## 2021-11-12 DIAGNOSIS — M81.8 OTHER OSTEOPOROSIS WITHOUT CURRENT PATHOLOGICAL FRACTURE: Primary | ICD-10-CM

## 2021-11-12 PROCEDURE — 63600175 PHARM REV CODE 636 W HCPCS: Mod: JG,PN | Performed by: INTERNAL MEDICINE

## 2021-11-12 PROCEDURE — 96372 THER/PROPH/DIAG INJ SC/IM: CPT | Mod: PN

## 2021-11-12 RX ORDER — ACETAMINOPHEN 500 MG
1000 TABLET ORAL
Status: CANCELLED | OUTPATIENT
Start: 2021-11-17 | End: 2021-11-17

## 2021-11-12 RX ORDER — DIPHENHYDRAMINE HCL 25 MG
25 CAPSULE ORAL
Status: CANCELLED | OUTPATIENT
Start: 2021-11-17 | End: 2021-11-17

## 2021-11-12 RX ADMIN — DENOSUMAB 60 MG: 60 INJECTION SUBCUTANEOUS at 10:11

## 2021-12-01 PROBLEM — K29.00 ACUTE GASTRITIS WITHOUT HEMORRHAGE: Status: ACTIVE | Noted: 2021-12-01

## 2021-12-01 PROBLEM — R79.89 ELEVATED TROPONIN: Status: ACTIVE | Noted: 2021-12-01

## 2021-12-01 PROBLEM — E87.1 HYPONATREMIA: Status: ACTIVE | Noted: 2021-12-01

## 2021-12-02 PROBLEM — I48.91 ATRIAL FIBRILLATION WITH RAPID VENTRICULAR RESPONSE: Status: ACTIVE | Noted: 2018-09-29

## 2021-12-06 ENCOUNTER — TELEPHONE (OUTPATIENT)
Dept: ENDOCRINOLOGY | Facility: CLINIC | Age: 76
End: 2021-12-06
Payer: MEDICARE

## 2021-12-09 ENCOUNTER — TELEPHONE (OUTPATIENT)
Dept: ENDOCRINOLOGY | Facility: CLINIC | Age: 76
End: 2021-12-09
Payer: MEDICARE

## 2021-12-09 DIAGNOSIS — E22.2 SIADH (SYNDROME OF INAPPROPRIATE ADH PRODUCTION): Primary | ICD-10-CM

## 2021-12-23 ENCOUNTER — TELEPHONE (OUTPATIENT)
Dept: ENDOCRINOLOGY | Facility: CLINIC | Age: 76
End: 2021-12-23
Payer: MEDICARE

## 2022-04-25 ENCOUNTER — TELEPHONE (OUTPATIENT)
Dept: ENDOCRINOLOGY | Facility: CLINIC | Age: 77
End: 2022-04-25
Payer: MEDICARE

## 2022-04-25 DIAGNOSIS — E27.40 ADRENAL INSUFFICIENCY: Primary | ICD-10-CM

## 2022-04-25 NOTE — TELEPHONE ENCOUNTER
----- Message from Marjorie Holden sent at 4/25/2022 10:20 AM CDT -----  Contact: self  Patient has an appt on 5/4 and needs to have her labs ordered.  Call back to after to advise at [phone] and thanks.  If no answer please leave a message let her know if they are fasting as well.

## 2022-04-26 ENCOUNTER — LAB VISIT (OUTPATIENT)
Dept: LAB | Facility: HOSPITAL | Age: 77
End: 2022-04-26
Attending: INTERNAL MEDICINE
Payer: MEDICARE

## 2022-04-26 DIAGNOSIS — E27.40 ADRENAL INSUFFICIENCY: ICD-10-CM

## 2022-04-26 LAB
ALBUMIN SERPL BCP-MCNC: 3.2 G/DL (ref 3.5–5.2)
ANION GAP SERPL CALC-SCNC: 9 MMOL/L (ref 8–16)
BUN SERPL-MCNC: 10 MG/DL (ref 8–23)
CALCIUM SERPL-MCNC: 8.7 MG/DL (ref 8.7–10.5)
CHLORIDE SERPL-SCNC: 102 MMOL/L (ref 95–110)
CO2 SERPL-SCNC: 24 MMOL/L (ref 23–29)
CREAT SERPL-MCNC: 0.8 MG/DL (ref 0.5–1.4)
EST. GFR  (AFRICAN AMERICAN): >60 ML/MIN/1.73 M^2
EST. GFR  (NON AFRICAN AMERICAN): >60 ML/MIN/1.73 M^2
GLUCOSE SERPL-MCNC: 135 MG/DL (ref 70–110)
PHOSPHATE SERPL-MCNC: 3.6 MG/DL (ref 2.7–4.5)
POTASSIUM SERPL-SCNC: 3.2 MMOL/L (ref 3.5–5.1)
SODIUM SERPL-SCNC: 135 MMOL/L (ref 136–145)

## 2022-04-26 PROCEDURE — 80069 RENAL FUNCTION PANEL: CPT | Performed by: INTERNAL MEDICINE

## 2022-04-26 PROCEDURE — 36415 COLL VENOUS BLD VENIPUNCTURE: CPT | Mod: PO | Performed by: INTERNAL MEDICINE

## 2022-05-04 ENCOUNTER — OFFICE VISIT (OUTPATIENT)
Dept: ENDOCRINOLOGY | Facility: CLINIC | Age: 77
End: 2022-05-04
Payer: MEDICARE

## 2022-05-04 VITALS
BODY MASS INDEX: 16.73 KG/M2 | DIASTOLIC BLOOD PRESSURE: 70 MMHG | HEART RATE: 61 BPM | SYSTOLIC BLOOD PRESSURE: 124 MMHG | WEIGHT: 98 LBS | OXYGEN SATURATION: 95 % | HEIGHT: 64 IN

## 2022-05-04 DIAGNOSIS — E04.2 MULTINODULAR GOITER: ICD-10-CM

## 2022-05-04 DIAGNOSIS — M81.0 OSTEOPOROSIS, UNSPECIFIED OSTEOPOROSIS TYPE, UNSPECIFIED PATHOLOGICAL FRACTURE PRESENCE: ICD-10-CM

## 2022-05-04 DIAGNOSIS — E27.40 ADRENAL INSUFFICIENCY: Primary | ICD-10-CM

## 2022-05-04 DIAGNOSIS — E22.2 SIADH (SYNDROME OF INAPPROPRIATE ADH PRODUCTION): ICD-10-CM

## 2022-05-04 PROCEDURE — 1126F AMNT PAIN NOTED NONE PRSNT: CPT | Mod: CPTII,S$GLB,, | Performed by: INTERNAL MEDICINE

## 2022-05-04 PROCEDURE — 3074F PR MOST RECENT SYSTOLIC BLOOD PRESSURE < 130 MM HG: ICD-10-PCS | Mod: CPTII,S$GLB,, | Performed by: INTERNAL MEDICINE

## 2022-05-04 PROCEDURE — 1159F PR MEDICATION LIST DOCUMENTED IN MEDICAL RECORD: ICD-10-PCS | Mod: CPTII,S$GLB,, | Performed by: INTERNAL MEDICINE

## 2022-05-04 PROCEDURE — 99999 PR PBB SHADOW E&M-EST. PATIENT-LVL V: CPT | Mod: PBBFAC,,, | Performed by: INTERNAL MEDICINE

## 2022-05-04 PROCEDURE — 3078F DIAST BP <80 MM HG: CPT | Mod: CPTII,S$GLB,, | Performed by: INTERNAL MEDICINE

## 2022-05-04 PROCEDURE — 1159F MED LIST DOCD IN RCRD: CPT | Mod: CPTII,S$GLB,, | Performed by: INTERNAL MEDICINE

## 2022-05-04 PROCEDURE — 99214 OFFICE O/P EST MOD 30 MIN: CPT | Mod: S$GLB,,, | Performed by: INTERNAL MEDICINE

## 2022-05-04 PROCEDURE — 99999 PR PBB SHADOW E&M-EST. PATIENT-LVL V: ICD-10-PCS | Mod: PBBFAC,,, | Performed by: INTERNAL MEDICINE

## 2022-05-04 PROCEDURE — 99214 PR OFFICE/OUTPT VISIT, EST, LEVL IV, 30-39 MIN: ICD-10-PCS | Mod: S$GLB,,, | Performed by: INTERNAL MEDICINE

## 2022-05-04 PROCEDURE — 1160F PR REVIEW ALL MEDS BY PRESCRIBER/CLIN PHARMACIST DOCUMENTED: ICD-10-PCS | Mod: CPTII,S$GLB,, | Performed by: INTERNAL MEDICINE

## 2022-05-04 PROCEDURE — 1160F RVW MEDS BY RX/DR IN RCRD: CPT | Mod: CPTII,S$GLB,, | Performed by: INTERNAL MEDICINE

## 2022-05-04 PROCEDURE — 3078F PR MOST RECENT DIASTOLIC BLOOD PRESSURE < 80 MM HG: ICD-10-PCS | Mod: CPTII,S$GLB,, | Performed by: INTERNAL MEDICINE

## 2022-05-04 PROCEDURE — 3288F PR FALLS RISK ASSESSMENT DOCUMENTED: ICD-10-PCS | Mod: CPTII,S$GLB,, | Performed by: INTERNAL MEDICINE

## 2022-05-04 PROCEDURE — 3288F FALL RISK ASSESSMENT DOCD: CPT | Mod: CPTII,S$GLB,, | Performed by: INTERNAL MEDICINE

## 2022-05-04 PROCEDURE — 1101F PR PT FALLS ASSESS DOC 0-1 FALLS W/OUT INJ PAST YR: ICD-10-PCS | Mod: CPTII,S$GLB,, | Performed by: INTERNAL MEDICINE

## 2022-05-04 PROCEDURE — 1126F PR PAIN SEVERITY QUANTIFIED, NO PAIN PRESENT: ICD-10-PCS | Mod: CPTII,S$GLB,, | Performed by: INTERNAL MEDICINE

## 2022-05-04 PROCEDURE — 3074F SYST BP LT 130 MM HG: CPT | Mod: CPTII,S$GLB,, | Performed by: INTERNAL MEDICINE

## 2022-05-04 PROCEDURE — 1101F PT FALLS ASSESS-DOCD LE1/YR: CPT | Mod: CPTII,S$GLB,, | Performed by: INTERNAL MEDICINE

## 2022-05-04 RX ORDER — HYDROCORTISONE SODIUM SUCCINATE 100 MG/2ML
100 INJECTION, POWDER, FOR SOLUTION INTRAMUSCULAR; INTRAVENOUS ONCE
Qty: 1 EACH | Refills: 0 | Status: SHIPPED | OUTPATIENT
Start: 2022-05-04 | End: 2022-05-04

## 2022-05-04 NOTE — PROGRESS NOTES
CHIEF COMPLAINT: Adrenal Insufficiency/ Osteoporosis/ SIADH/ Multinodular Goiter.   76 y.o.   being seen as a f/u. She had a benign FNA 3/21/13. 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 She is seeing nephrology. Started on a trial of hydrocortisone for possible adrenal insufficiency (could not R/O based on cosyntropin stim test and having fatigue). On prednisone 5 mg currently. Taking Ca + D. Had a fracture of the pelvis Sept 2018 after a fall when walking to the bathroom.   Prolia scheduled 5/12/22. Has been having knee pain. Has received some steroid injections. No Palpitations. No tremors. No N/V. Admitted in Dec with N/V and became hyponatremic. No difficulty swallowing. Wearing a medic alert bracelet.                  PAST MEDICAL HISTORY: Asthma, WPW, depression/anxiety, hyperlipidemia.      PAST SURGICAL HISTORY: right knee replacement. Hand surgery.      SOCIAL HISTORY: No T/A      FAMILY HISTORY: No known thyroid disease or thyroid cancer. Dementia.      MEDICATIONS/ALLERGIES: The patient's MedCard has been updated and reviewed.      ROS:   Constitutional: weight stable.   Cardiovascular: No CP  Respiratory: SOB at baseline   Remainder ROS negative            PE:   GENERAL: Well developed, well nourished.   NECK: Supple, trachea midline, Left nodule palpable. No LAD   CHEST: Resp even and unlabored, CTA bilateral.   CARDIAC: RRR, S1, S2 heard, no murmurs, rubs, S3, or S4          Latest Reference Range & Units 04/26/22 09:58   Sodium 136 - 145 mmol/L 135 (L)   Potassium 3.5 - 5.1 mmol/L 3.2 (L)   Chloride 95 - 110 mmol/L 102   CO2 23 - 29 mmol/L 24   Anion Gap 8 - 16 mmol/L 9   BUN 8 - 23 mg/dL 10   Creatinine 0.5 - 1.4 mg/dL 0.8   EGFR if non African American >60 mL/min/1.73 m^2 >60.0 [1]   EGFR if African American >60 mL/min/1.73 m^2 >60.0   Glucose 70 - 110 mg/dL 135 (H)   Calcium 8.7 - 10.5 mg/dL 8.7   Phosphorus 2.7 - 4.5 mg/dL 3.6   Albumin 3.5 - 5.2 g/dL 3.2 (L)         ASSESSMENT/PLAN:   1.  Adrenal Insuffiency- Labs borderline for AI. Since having some symptoms that could be AI related decided to do a trial of steroids. Repeat testing could not rule out AI and symptoms improved with steroids. Continue steroids. Discussed sick day precautions. Wearing medic alert. Gave info in AVS and gave emergency injection.      2. Multinodular goiter-S/P benign FNA of left nodule. Last US shows no significant changes from before. Denies obstructive symptoms. Check at f/u     3. SIADH- . Being followed by nephrology.      4. Osteoporosis- has had a pelvis fracture. On Prolia. Taking Ca + D. Discussed fall precautions. Therapy plan placed. DEXA due 11/2023.      FOLLOWUP  F/U 6 months with CMP, TSH Thyroid Ultrasound,

## 2022-05-04 NOTE — PATIENT INSTRUCTIONS
Adrenal Insufficiency Management   How do I prevent an adrenal crisis?   Get the flu shot every year.   Take your medications as prescribed.   Keep up to date with doctor appointments, including regular checkups.     The symptoms of an adrenal insufficiency crisis can include   Extreme weakness   Mental confusion   Extreme drowsiness, in advanced cases slipping towards a coma   Pronounced dizziness   Nausea and/or vomiting   Severe headache   Abnormal heart rate - either too fast or too slow   Abnormally low blood pressure   Feeling extremely cold   Possible fever   Possible abdominal tenderness     What do I do when I am sick?   Coughs, colds, or other minor illness with oral temperature less than 100 degrees F   ? You do not need to increase your oral steroid dose.   Serious illnesses with fever 100-101.9 degrees F (such as the flu)   ? Double your normal steroid dose and contact your doctor.   ? If your fever is 102 degrees F or higher, triple your normal dose and contact your doctor.   ? If you are taking fludrocortisone, you do not have to increase your steroid dose during illness.   Diarrhea lasting more than a day   ? Double your steroid dose.   Nausea and vomiting   ? Take anti-nausea medication.   ? Take an extra dose of steroid to see if you can keep it down for more than 30 minutes.   ? If you are unable to keep the steroid dose down for more than 30 minutes, give yourself an intramuscular (IM) steroid injection and report to the emergency room.   ? Be sure someone is around the house in case your condition worsens.   Surgery   ? Dental surgery or minor surgery requiring only local anesthesia: You do not need to increase your steroid dose.   ? Moderately stressful surgery (barium enema, endoscopy, cataract surgery, major oral surgery): You should receive a hydrocortisone 100 mg IV before the procedure.   ? Major surgery (, heart operation): You should receive a hydrocortisone 100 mg IV  before the procedure and then every 8 hours for 48-72 hours.     [id number]   Page 1 of 2   Last updated 08/2011   Adrenal Insufficiency Management   When should I give myself an emergency injection?   As a general rule, persons with adrenal insufficiency should give themselves an emergency dose of injectable steroid if they vomit more than once.   Do not wait until you are too weak and confused to give yourself the injection. Keep ahead of your illness.   If you are able to swallow medication and keep it down at least 30 minutes, an injection may not be necessary.   If you are unable to keep the steroid down for 30 minutes, you will need an emergency injection as soon as possible.   If you are experiencing a rapid deterioration in your condition (such as severe physical shock), you will need an emergency injection as soon as possible.   If you are seriously injured, you may need a stress dose of injectable steroids to stabilize your blood pressure before vomiting occurs.   Once you have given yourself an injection, seek immediate medical help. The injection medication is intended to stabilize your condition until you can get emergency medical help, not as a replacement for medical care.     Steroid Injections   1. Dexamethasone (Cortastat, Cortistat LA, Cortastat 10, Dexasone LA)   a. 4 mg / 1 mL vial   b. 3 mL syringes   c. [place]     Store at room temperature. Keep out of the light.   2. Hydrocortisone (Solu-Cortef)   a. Solu-Cortef 100 mg   b. 1 ampule of water   c. 3 mL syringe   d. [place]cola     More stable in high temperatures. Must be mixed with water before administration.   All patients with adrenal insufficiency should wear a MedicAlert bracelet or tag that identifies their condition as follows: Adrenal Insufficiency. Steroid Dependent.   [id number]   Page 2 of 2   Last updated 08/2011

## 2022-05-09 ENCOUNTER — TELEPHONE (OUTPATIENT)
Dept: ENDOCRINOLOGY | Facility: CLINIC | Age: 77
End: 2022-05-09
Payer: MEDICARE

## 2022-05-09 NOTE — TELEPHONE ENCOUNTER
Spoke w/ [facility], gave verbal from Dr. Nelson that the solu cortef should be 100 mg IM prn adrenal crisis (not IV).

## 2022-05-09 NOTE — TELEPHONE ENCOUNTER
----- Message from Jean Nelson DO sent at 5/5/2022  4:17 PM CDT -----  Contact: [facility]  Should be IM  ----- Message -----  From: Jaye Luong LPN  Sent: 5/5/2022   3:36 PM CDT  To: Jean Nelson DO    Pharmacy is calling to verify solu-cortef Rx.  States give IV?  ----- Message -----  From: Britt Cruz, Patient Care Assistant  Sent: 5/5/2022   3:18 PM CDT  To: Omar Pena Staff (Endo)    Type: Needs Medical Advice    Who Called: [facility]  Best Call Back Number:[phone]  Inquiry/Question: [facility] is calling to verify the route of a medication. Prescription only states IV. Please call back and advise.Thank you~

## 2022-05-12 ENCOUNTER — INFUSION (OUTPATIENT)
Dept: INFUSION THERAPY | Facility: HOSPITAL | Age: 77
End: 2022-05-12
Attending: INTERNAL MEDICINE
Payer: MEDICARE

## 2022-05-12 VITALS
SYSTOLIC BLOOD PRESSURE: 122 MMHG | TEMPERATURE: 97 F | DIASTOLIC BLOOD PRESSURE: 70 MMHG | RESPIRATION RATE: 16 BRPM | HEART RATE: 68 BPM

## 2022-05-12 DIAGNOSIS — M81.8 OTHER OSTEOPOROSIS WITHOUT CURRENT PATHOLOGICAL FRACTURE: Primary | ICD-10-CM

## 2022-05-12 PROCEDURE — 96372 THER/PROPH/DIAG INJ SC/IM: CPT | Mod: PN

## 2022-05-12 PROCEDURE — 63600175 PHARM REV CODE 636 W HCPCS: Mod: JG,PN | Performed by: INTERNAL MEDICINE

## 2022-05-12 RX ORDER — DIPHENHYDRAMINE HCL 25 MG
25 CAPSULE ORAL
Status: CANCELLED | OUTPATIENT
Start: 2022-06-08 | End: 2022-06-08

## 2022-05-12 RX ORDER — ACETAMINOPHEN 500 MG
1000 TABLET ORAL
Status: CANCELLED | OUTPATIENT
Start: 2022-06-08 | End: 2022-06-08

## 2022-05-12 RX ADMIN — DENOSUMAB 60 MG: 60 INJECTION SUBCUTANEOUS at 09:05

## 2022-05-12 NOTE — PLAN OF CARE
Problem: Adult Inpatient Plan of Care  Goal: Patient-Specific Goal (Individualized)  Outcome: Ongoing, Progressing     Problem: Fatigue  Goal: Improved Activity Tolerance  Outcome: Ongoing, Progressing   Tolerated injection well today  Discharge instructions given and pt d/c to home per w/c  NAD

## 2022-08-28 PROBLEM — I70.0 THORACIC AORTA ATHEROSCLEROSIS: Chronic | Status: ACTIVE | Noted: 2019-12-06

## 2022-08-28 PROBLEM — E87.1 HYPONATREMIA: Chronic | Status: ACTIVE | Noted: 2021-12-01

## 2022-08-28 PROBLEM — N39.0 RECURRENT UTI: Chronic | Status: ACTIVE | Noted: 2018-07-22

## 2022-08-28 PROBLEM — N28.1 RENAL CYST: Chronic | Status: ACTIVE | Noted: 2020-08-12

## 2022-08-28 PROBLEM — N18.31 STAGE 3A CHRONIC KIDNEY DISEASE: Chronic | Status: ACTIVE | Noted: 2021-05-07

## 2022-08-28 PROBLEM — J30.9 ALLERGIC RHINITIS: Chronic | Status: ACTIVE | Noted: 2020-08-12

## 2022-08-28 PROBLEM — Z78.9 STATIN INTOLERANCE: Chronic | Status: ACTIVE | Noted: 2022-08-28

## 2022-10-12 RX ORDER — PREDNISONE 5 MG/1
5 TABLET ORAL DAILY
Qty: 90 TABLET | Refills: 3 | Status: SHIPPED | OUTPATIENT
Start: 2022-10-12 | End: 2023-07-19 | Stop reason: SDUPTHER

## 2022-10-26 ENCOUNTER — HOSPITAL ENCOUNTER (OUTPATIENT)
Dept: RADIOLOGY | Facility: HOSPITAL | Age: 77
Discharge: HOME | End: 2022-10-26
Attending: INTERNAL MEDICINE
Payer: MEDICARE

## 2022-10-26 DIAGNOSIS — E22.2 SIADH (SYNDROME OF INAPPROPRIATE ADH PRODUCTION): ICD-10-CM

## 2022-10-26 DIAGNOSIS — E27.40 ADRENAL INSUFFICIENCY: ICD-10-CM

## 2022-10-26 DIAGNOSIS — M81.0 OSTEOPOROSIS, UNSPECIFIED OSTEOPOROSIS TYPE, UNSPECIFIED PATHOLOGICAL FRACTURE PRESENCE: ICD-10-CM

## 2022-10-26 DIAGNOSIS — E04.2 MULTINODULAR GOITER: ICD-10-CM

## 2022-10-26 PROCEDURE — 76536 US EXAM OF HEAD AND NECK: CPT | Mod: 26,,, | Performed by: RADIOLOGY

## 2022-10-26 PROCEDURE — 76536 US EXAM OF HEAD AND NECK: CPT | Mod: TC,PO

## 2022-10-26 PROCEDURE — 76536 US SOFT TISSUE HEAD NECK THYROID: ICD-10-PCS | Mod: 26,,, | Performed by: RADIOLOGY

## 2022-10-31 ENCOUNTER — OFFICE VISIT (OUTPATIENT)
Dept: ENDOCRINOLOGY | Facility: CLINIC | Age: 77
End: 2022-10-31
Payer: MEDICARE

## 2022-10-31 VITALS
HEIGHT: 64 IN | RESPIRATION RATE: 18 BRPM | SYSTOLIC BLOOD PRESSURE: 90 MMHG | DIASTOLIC BLOOD PRESSURE: 60 MMHG | HEART RATE: 72 BPM | BODY MASS INDEX: 16.69 KG/M2 | WEIGHT: 97.75 LBS

## 2022-10-31 DIAGNOSIS — E22.2 SIADH (SYNDROME OF INAPPROPRIATE ADH PRODUCTION): ICD-10-CM

## 2022-10-31 DIAGNOSIS — E27.40 ADRENAL INSUFFICIENCY: Primary | ICD-10-CM

## 2022-10-31 DIAGNOSIS — E04.2 MULTINODULAR GOITER: ICD-10-CM

## 2022-10-31 DIAGNOSIS — M81.0 OSTEOPOROSIS, UNSPECIFIED OSTEOPOROSIS TYPE, UNSPECIFIED PATHOLOGICAL FRACTURE PRESENCE: ICD-10-CM

## 2022-10-31 PROCEDURE — 99999 PR PBB SHADOW E&M-EST. PATIENT-LVL V: ICD-10-PCS | Mod: PBBFAC,,, | Performed by: INTERNAL MEDICINE

## 2022-10-31 PROCEDURE — 99214 PR OFFICE/OUTPT VISIT, EST, LEVL IV, 30-39 MIN: ICD-10-PCS | Mod: S$GLB,,, | Performed by: INTERNAL MEDICINE

## 2022-10-31 PROCEDURE — 99214 OFFICE O/P EST MOD 30 MIN: CPT | Mod: S$GLB,,, | Performed by: INTERNAL MEDICINE

## 2022-10-31 PROCEDURE — 99999 PR PBB SHADOW E&M-EST. PATIENT-LVL V: CPT | Mod: PBBFAC,,, | Performed by: INTERNAL MEDICINE

## 2022-10-31 PROCEDURE — 1125F AMNT PAIN NOTED PAIN PRSNT: CPT | Mod: CPTII,S$GLB,, | Performed by: INTERNAL MEDICINE

## 2022-10-31 PROCEDURE — 1101F PR PT FALLS ASSESS DOC 0-1 FALLS W/OUT INJ PAST YR: ICD-10-PCS | Mod: CPTII,S$GLB,, | Performed by: INTERNAL MEDICINE

## 2022-10-31 PROCEDURE — 1125F PR PAIN SEVERITY QUANTIFIED, PAIN PRESENT: ICD-10-PCS | Mod: CPTII,S$GLB,, | Performed by: INTERNAL MEDICINE

## 2022-10-31 PROCEDURE — 3288F PR FALLS RISK ASSESSMENT DOCUMENTED: ICD-10-PCS | Mod: CPTII,S$GLB,, | Performed by: INTERNAL MEDICINE

## 2022-10-31 PROCEDURE — 1159F MED LIST DOCD IN RCRD: CPT | Mod: CPTII,S$GLB,, | Performed by: INTERNAL MEDICINE

## 2022-10-31 PROCEDURE — 1160F PR REVIEW ALL MEDS BY PRESCRIBER/CLIN PHARMACIST DOCUMENTED: ICD-10-PCS | Mod: CPTII,S$GLB,, | Performed by: INTERNAL MEDICINE

## 2022-10-31 PROCEDURE — 3078F PR MOST RECENT DIASTOLIC BLOOD PRESSURE < 80 MM HG: ICD-10-PCS | Mod: CPTII,S$GLB,, | Performed by: INTERNAL MEDICINE

## 2022-10-31 PROCEDURE — 3074F SYST BP LT 130 MM HG: CPT | Mod: CPTII,S$GLB,, | Performed by: INTERNAL MEDICINE

## 2022-10-31 PROCEDURE — 1160F RVW MEDS BY RX/DR IN RCRD: CPT | Mod: CPTII,S$GLB,, | Performed by: INTERNAL MEDICINE

## 2022-10-31 PROCEDURE — 1101F PT FALLS ASSESS-DOCD LE1/YR: CPT | Mod: CPTII,S$GLB,, | Performed by: INTERNAL MEDICINE

## 2022-10-31 PROCEDURE — 3078F DIAST BP <80 MM HG: CPT | Mod: CPTII,S$GLB,, | Performed by: INTERNAL MEDICINE

## 2022-10-31 PROCEDURE — 1159F PR MEDICATION LIST DOCUMENTED IN MEDICAL RECORD: ICD-10-PCS | Mod: CPTII,S$GLB,, | Performed by: INTERNAL MEDICINE

## 2022-10-31 PROCEDURE — 3288F FALL RISK ASSESSMENT DOCD: CPT | Mod: CPTII,S$GLB,, | Performed by: INTERNAL MEDICINE

## 2022-10-31 PROCEDURE — 3074F PR MOST RECENT SYSTOLIC BLOOD PRESSURE < 130 MM HG: ICD-10-PCS | Mod: CPTII,S$GLB,, | Performed by: INTERNAL MEDICINE

## 2022-10-31 NOTE — PROGRESS NOTES
CHIEF COMPLAINT: Adrenal Insufficiency/ Osteoporosis/ SIADH/ Multinodular Goiter.   77 y.o.   being seen as a f/u. She had a benign FNA 3/21/13. 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 She is seeing nephrology. Started on a trial of hydrocortisone for possible adrenal insufficiency (could not R/O based on cosyntropin stim test and having fatigue). On prednisone 5 mg currently. Taking Ca + D. Had a fracture of the pelvis Sept 2018 after a fall when walking to the bathroom.   Prolia scheduled 11/11/22. Tolerating Prolia. Taking Ca + D. She is exercising. Has some knee pain which does limit exercise. Had a fall at the doctors office after slipping off rolling chair. Her medic alert bracelet is broken and is getting it fixed. Has not had to use sick day precautions. No N/V. Weight down to 97.7.                  PAST MEDICAL HISTORY: Asthma, WPW, depression/anxiety, hyperlipidemia.      PAST SURGICAL HISTORY: right knee replacement. Hand surgery.      SOCIAL HISTORY: No T/A      FAMILY HISTORY: No known thyroid disease or thyroid cancer. Dementia.      MEDICATIONS/ALLERGIES: The patient's MedCard has been updated and reviewed.         PE:   GENERAL: Well developed, well nourished.   NECK: Supple, trachea midline, Left nodule palpable. No LAD   CHEST: Resp even and unlabored, CTA bilateral.   CARDIAC: RRR, S1, S2 heard, no murmurs, rubs, S3, or S4          Latest Reference Range & Units 10/26/22 12:45   Sodium 136 - 145 mmol/L 133 (L)   Potassium 3.5 - 5.1 mmol/L 3.8   Chloride 95 - 110 mmol/L 104   CO2 23 - 29 mmol/L 20 (L)   Anion Gap 8 - 16 mmol/L 9   BUN 8 - 23 mg/dL 20   Creatinine 0.5 - 1.4 mg/dL 0.9   eGFR >60 mL/min/1.73 m^2 >60.0   Glucose 70 - 110 mg/dL 108   Calcium 8.7 - 10.5 mg/dL 8.8   Alkaline Phosphatase 55 - 135 U/L 37 (L)   PROTEIN TOTAL 6.0 - 8.4 g/dL 6.7   Albumin 3.5 - 5.2 g/dL 3.3 (L)   BILIRUBIN TOTAL 0.1 - 1.0 mg/dL 0.3   AST 10 - 40 U/L 34   ALT 10 - 44 U/L 19   (L): Data is abnormally low    Latest Reference Range & Units 10/26/22 12:45   TSH 0.400 - 4.000 uIU/mL 0.610       Corrected calcium 9.36    US SOFT TISSUE HEAD NECK THYROID     CLINICAL HISTORY:  Unspecified adrenocortical insufficiency     TECHNIQUE:  Ultrasound of the thyroid and cervical lymph nodes was performed.     COMPARISON:  10/27/2021     FINDINGS:  The right lobe of thyroid gland measures 3.7 x 1.5 x 1.4 cm in size.  The left lobe the thyroid gland measures 4.1 x 1.5 x 1.5cm in size.  This gives an approximate volume of on the right of 4.1cc and an approximate volume on the left of 4.8 cc for a total approximate volume of 8.9 cc.     Multiple thyroid nodules are noted including a solid isoechoic tie slightly hypoechoic wider than tall nodule of 1.5 cm in the mid right lobe of the thyroid gland without convincing detrimental change since the prior when measured in a similar manner.  No obvious microcalcifications are noted.     Within the left lobe of the thyroid gland multiple thyroid nodules are also noted.     At the mid to upper right lobe of the thyroid gland a 1.9 by 1.2 x 1.0 cm nodule is noted with microcalcifications unchanged since 10/27/2021 that was described as a TI-RADS 5 nodule on that exam, consideration for fine-needle aspiration or biopsy was suggested on that exam.  This appears unchanged since that time.     Multiple other nodules are noted including a 1.5 cm hypoechoic isoechoic nodule with a coarse calcification internally unchanged when measured in a similar manner on the prior at 1.5 x 1.4 x 1.4 cm.     Impression:     1. Multiple thyroid nodules without convincing worrisome detrimental change since 10/27/2021.       ASSESSMENT/PLAN:   1. Adrenal Insuffiency- Labs borderline for AI. Since having some symptoms that could be AI related decided to do a trial of steroids. Repeat testing could not rule out AI and symptoms improved with steroids. Continue steroids. Discussed sick day precautions.  States she is  getting her medical alert bracelet fixed.      2. Multinodular goiter-S/P benign FNA of left nodule. US shows no significant changes from before. Denies obstructive symptoms. Can start doing Ultrasound Q 2 years.      3. SIADH- . Being followed by nephrology.      4. Osteoporosis- has had a pelvis fracture. On Prolia. Taking Ca + D. Discussed fall precautions. Therapy plan placed. DEXA due 11/2023.      FOLLOWUP  F/U 6 months with Renal Panel prior to next prolia.

## 2022-11-10 ENCOUNTER — INFUSION (OUTPATIENT)
Dept: INFUSION THERAPY | Facility: HOSPITAL | Age: 77
End: 2022-11-10
Attending: INTERNAL MEDICINE
Payer: MEDICARE

## 2022-11-10 VITALS
OXYGEN SATURATION: 99 % | BODY MASS INDEX: 16.68 KG/M2 | DIASTOLIC BLOOD PRESSURE: 70 MMHG | SYSTOLIC BLOOD PRESSURE: 114 MMHG | WEIGHT: 97.69 LBS | HEART RATE: 70 BPM | RESPIRATION RATE: 20 BRPM | TEMPERATURE: 98 F | HEIGHT: 64 IN

## 2022-11-10 DIAGNOSIS — M81.8 OTHER OSTEOPOROSIS WITHOUT CURRENT PATHOLOGICAL FRACTURE: Primary | ICD-10-CM

## 2022-11-10 PROBLEM — J44.9 COPD (CHRONIC OBSTRUCTIVE PULMONARY DISEASE): Status: ACTIVE | Noted: 2022-11-10

## 2022-11-10 PROBLEM — G45.9 TIA (TRANSIENT ISCHEMIC ATTACK): Status: ACTIVE | Noted: 2022-11-10

## 2022-11-10 PROBLEM — Z71.89 ADVANCE CARE PLANNING: Status: ACTIVE | Noted: 2022-11-10

## 2022-11-10 PROCEDURE — 96372 THER/PROPH/DIAG INJ SC/IM: CPT | Mod: PN

## 2022-11-10 PROCEDURE — 63600175 PHARM REV CODE 636 W HCPCS: Mod: JG,PN | Performed by: INTERNAL MEDICINE

## 2022-11-10 RX ORDER — DIPHENHYDRAMINE HCL 25 MG
25 CAPSULE ORAL
Status: CANCELLED | OUTPATIENT
Start: 2022-11-16 | End: 2022-11-16

## 2022-11-10 RX ORDER — ACETAMINOPHEN 500 MG
1000 TABLET ORAL
Status: CANCELLED | OUTPATIENT
Start: 2022-11-16 | End: 2022-11-16

## 2022-11-10 RX ADMIN — DENOSUMAB 60 MG: 60 INJECTION SUBCUTANEOUS at 09:11

## 2022-11-10 NOTE — PLAN OF CARE
Problem: Adult Inpatient Plan of Care  Goal: Plan of Care Review  Outcome: Ongoing, Progressing  Goal: Patient-Specific Goal (Individualized)  Outcome: Ongoing, Progressing     Problem: Fatigue  Goal: Improved Activity Tolerance  Outcome: Ongoing, Progressing   .Patient tolerated prolia  injection well, d/c in no acute distress.

## 2022-11-11 PROBLEM — R29.818 TRANSIENT NEUROLOGICAL SYMPTOMS: Status: ACTIVE | Noted: 2022-11-10

## 2022-11-11 PROBLEM — D32.9 MENINGIOMA: Status: ACTIVE | Noted: 2022-11-11

## 2022-11-12 PROBLEM — I31.39 IDIOPATHIC PERICARDIAL EFFUSION: Status: ACTIVE | Noted: 2022-11-12

## 2022-11-13 PROBLEM — I67.89 CEREBRAL MICROVASCULAR DISEASE: Status: ACTIVE | Noted: 2022-11-13

## 2022-11-13 PROBLEM — I34.0 NONRHEUMATIC MITRAL VALVE REGURGITATION: Status: ACTIVE | Noted: 2022-11-13

## 2022-11-13 PROBLEM — I36.1 NONRHEUMATIC TRICUSPID VALVE REGURGITATION: Chronic | Status: ACTIVE | Noted: 2022-11-13

## 2022-11-28 PROBLEM — N39.0 RECURRENT UTI: Chronic | Status: RESOLVED | Noted: 2018-07-22 | Resolved: 2022-11-28

## 2022-11-29 PROBLEM — Z78.9 STATIN INTOLERANCE: Chronic | Status: RESOLVED | Noted: 2022-08-28 | Resolved: 2022-11-29

## 2023-03-28 PROBLEM — I25.2 HISTORY OF MI (MYOCARDIAL INFARCTION): Status: ACTIVE | Noted: 2023-03-28

## 2023-05-01 ENCOUNTER — LAB VISIT (OUTPATIENT)
Dept: LAB | Facility: HOSPITAL | Age: 78
End: 2023-05-01
Attending: INTERNAL MEDICINE
Payer: MEDICARE

## 2023-05-01 DIAGNOSIS — E27.40 ADRENAL INSUFFICIENCY: ICD-10-CM

## 2023-05-01 DIAGNOSIS — E04.2 MULTINODULAR GOITER: ICD-10-CM

## 2023-05-01 DIAGNOSIS — M81.0 OSTEOPOROSIS, UNSPECIFIED OSTEOPOROSIS TYPE, UNSPECIFIED PATHOLOGICAL FRACTURE PRESENCE: ICD-10-CM

## 2023-05-01 DIAGNOSIS — E22.2 SIADH (SYNDROME OF INAPPROPRIATE ADH PRODUCTION): ICD-10-CM

## 2023-05-01 LAB
ALBUMIN SERPL BCP-MCNC: 3 G/DL (ref 3.5–5.2)
ANION GAP SERPL CALC-SCNC: 6 MMOL/L (ref 8–16)
BUN SERPL-MCNC: 12 MG/DL (ref 8–23)
CALCIUM SERPL-MCNC: 9.4 MG/DL (ref 8.7–10.5)
CHLORIDE SERPL-SCNC: 104 MMOL/L (ref 95–110)
CO2 SERPL-SCNC: 24 MMOL/L (ref 23–29)
CREAT SERPL-MCNC: 0.9 MG/DL (ref 0.5–1.4)
EST. GFR  (NO RACE VARIABLE): >60 ML/MIN/1.73 M^2
GLUCOSE SERPL-MCNC: 84 MG/DL (ref 70–110)
PHOSPHATE SERPL-MCNC: 4.7 MG/DL (ref 2.7–4.5)
POTASSIUM SERPL-SCNC: 3.4 MMOL/L (ref 3.5–5.1)
SODIUM SERPL-SCNC: 134 MMOL/L (ref 136–145)

## 2023-05-01 PROCEDURE — 36415 COLL VENOUS BLD VENIPUNCTURE: CPT | Mod: PO | Performed by: INTERNAL MEDICINE

## 2023-05-01 PROCEDURE — 80069 RENAL FUNCTION PANEL: CPT | Performed by: INTERNAL MEDICINE

## 2023-05-08 ENCOUNTER — OFFICE VISIT (OUTPATIENT)
Dept: ENDOCRINOLOGY | Facility: CLINIC | Age: 78
End: 2023-05-08
Payer: MEDICARE

## 2023-05-08 VITALS
SYSTOLIC BLOOD PRESSURE: 98 MMHG | BODY MASS INDEX: 16.73 KG/M2 | DIASTOLIC BLOOD PRESSURE: 62 MMHG | WEIGHT: 98 LBS | HEIGHT: 64 IN | HEART RATE: 82 BPM

## 2023-05-08 DIAGNOSIS — E04.2 MULTINODULAR GOITER: ICD-10-CM

## 2023-05-08 DIAGNOSIS — E22.2 SIADH (SYNDROME OF INAPPROPRIATE ADH PRODUCTION): ICD-10-CM

## 2023-05-08 DIAGNOSIS — E27.40 ADRENAL INSUFFICIENCY: Primary | ICD-10-CM

## 2023-05-08 DIAGNOSIS — M81.0 OSTEOPOROSIS, UNSPECIFIED OSTEOPOROSIS TYPE, UNSPECIFIED PATHOLOGICAL FRACTURE PRESENCE: ICD-10-CM

## 2023-05-08 PROCEDURE — 3074F PR MOST RECENT SYSTOLIC BLOOD PRESSURE < 130 MM HG: ICD-10-PCS | Mod: CPTII,S$GLB,, | Performed by: INTERNAL MEDICINE

## 2023-05-08 PROCEDURE — 1160F RVW MEDS BY RX/DR IN RCRD: CPT | Mod: CPTII,S$GLB,, | Performed by: INTERNAL MEDICINE

## 2023-05-08 PROCEDURE — 1101F PT FALLS ASSESS-DOCD LE1/YR: CPT | Mod: CPTII,S$GLB,, | Performed by: INTERNAL MEDICINE

## 2023-05-08 PROCEDURE — 1126F AMNT PAIN NOTED NONE PRSNT: CPT | Mod: CPTII,S$GLB,, | Performed by: INTERNAL MEDICINE

## 2023-05-08 PROCEDURE — 3074F SYST BP LT 130 MM HG: CPT | Mod: CPTII,S$GLB,, | Performed by: INTERNAL MEDICINE

## 2023-05-08 PROCEDURE — 1126F PR PAIN SEVERITY QUANTIFIED, NO PAIN PRESENT: ICD-10-PCS | Mod: CPTII,S$GLB,, | Performed by: INTERNAL MEDICINE

## 2023-05-08 PROCEDURE — 3288F PR FALLS RISK ASSESSMENT DOCUMENTED: ICD-10-PCS | Mod: CPTII,S$GLB,, | Performed by: INTERNAL MEDICINE

## 2023-05-08 PROCEDURE — 99999 PR PBB SHADOW E&M-EST. PATIENT-LVL IV: CPT | Mod: PBBFAC,,, | Performed by: INTERNAL MEDICINE

## 2023-05-08 PROCEDURE — 99999 PR PBB SHADOW E&M-EST. PATIENT-LVL IV: ICD-10-PCS | Mod: PBBFAC,,, | Performed by: INTERNAL MEDICINE

## 2023-05-08 PROCEDURE — 1160F PR REVIEW ALL MEDS BY PRESCRIBER/CLIN PHARMACIST DOCUMENTED: ICD-10-PCS | Mod: CPTII,S$GLB,, | Performed by: INTERNAL MEDICINE

## 2023-05-08 PROCEDURE — 1159F PR MEDICATION LIST DOCUMENTED IN MEDICAL RECORD: ICD-10-PCS | Mod: CPTII,S$GLB,, | Performed by: INTERNAL MEDICINE

## 2023-05-08 PROCEDURE — 3078F PR MOST RECENT DIASTOLIC BLOOD PRESSURE < 80 MM HG: ICD-10-PCS | Mod: CPTII,S$GLB,, | Performed by: INTERNAL MEDICINE

## 2023-05-08 PROCEDURE — 99214 PR OFFICE/OUTPT VISIT, EST, LEVL IV, 30-39 MIN: ICD-10-PCS | Mod: S$GLB,,, | Performed by: INTERNAL MEDICINE

## 2023-05-08 PROCEDURE — 1101F PR PT FALLS ASSESS DOC 0-1 FALLS W/OUT INJ PAST YR: ICD-10-PCS | Mod: CPTII,S$GLB,, | Performed by: INTERNAL MEDICINE

## 2023-05-08 PROCEDURE — 3288F FALL RISK ASSESSMENT DOCD: CPT | Mod: CPTII,S$GLB,, | Performed by: INTERNAL MEDICINE

## 2023-05-08 PROCEDURE — 3078F DIAST BP <80 MM HG: CPT | Mod: CPTII,S$GLB,, | Performed by: INTERNAL MEDICINE

## 2023-05-08 PROCEDURE — 99214 OFFICE O/P EST MOD 30 MIN: CPT | Mod: S$GLB,,, | Performed by: INTERNAL MEDICINE

## 2023-05-08 PROCEDURE — 1159F MED LIST DOCD IN RCRD: CPT | Mod: CPTII,S$GLB,, | Performed by: INTERNAL MEDICINE

## 2023-05-08 RX ORDER — FEXOFENADINE HCL 60 MG
60 TABLET ORAL DAILY
COMMUNITY
End: 2023-12-17 | Stop reason: CLARIF

## 2023-05-08 NOTE — PROGRESS NOTES
CHIEF COMPLAINT: Adrenal Insufficiency/ Osteoporosis/ SIADH/ Multinodular Goiter.   77 y.o.   being seen as a f/u. She had a benign FNA 3/21/13. 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 She is seeing nephrology. Started on a trial of hydrocortisone for possible adrenal insufficiency (could not R/O based on cosyntropin stim test and having fatigue). On prednisone 5 mg currently. Had a fracture of the pelvis Sept 2018 after a fall when walking to the bathroom.       Prolia scheduled 05/11/2023. Tolerating Prolia. Taking Ca + D. No new falls. No new fractures. Not exercising. Wearing medic alert bracelet. Has not required sick day precautions. Had a TIA Nov 2022. NO XRT to head/neck. No difficulty swallowing.                 PAST MEDICAL HISTORY: Asthma, WPW, depression/anxiety, hyperlipidemia.      PAST SURGICAL HISTORY: right knee replacement. Hand surgery.      SOCIAL HISTORY: No T/A      FAMILY HISTORY: No known thyroid disease or thyroid cancer. Dementia.      MEDICATIONS/ALLERGIES: The patient's MedCard has been updated and reviewed.         PE:   GENERAL: Well developed, well nourished.   NECK: Supple, trachea midline, Left nodule palpable. No LAD   CHEST: Resp even and unlabored, CTA bilateral.   CARDIAC: RRR, S1, S2 heard, no murmurs, rubs, S3, or S4          Latest Reference Range & Units 05/05/23 08:31   Vit D, 25-Hydroxy 30 - 96 ng/mL 76      Latest Reference Range & Units 05/01/23 10:55   Sodium 136 - 145 mmol/L 134 (L)   Potassium 3.5 - 5.1 mmol/L 3.4 (L)   Chloride 95 - 110 mmol/L 104   CO2 23 - 29 mmol/L 24   Anion Gap 8 - 16 mmol/L 6 (L)   BUN 8 - 23 mg/dL 12   Creatinine 0.5 - 1.4 mg/dL 0.9   eGFR >60 mL/min/1.73 m^2 >60.0   Glucose 70 - 110 mg/dL 84   Calcium 8.7 - 10.5 mg/dL 9.4   Phosphorus 2.7 - 4.5 mg/dL 4.7 (H)   Albumin 3.5 - 5.2 g/dL 3.0 (L)   (L): Data is abnormally low  (H): Data is abnormally high      ASSESSMENT/PLAN:   1. Adrenal Insuffiency- Labs borderline for AI. Since having some  symptoms that could be AI related decided to do a trial of steroids. Repeat testing could not rule out AI and symptoms improved with steroids, so continued steroids. Initially on Hydrocortisone, but could not tolerate. On prednisone and tolerating well. Discussed sick day precautions.  She is wearing medical alert bracelet.      2. Multinodular goiter-S/P benign FNA of left nodule. US shows no significant changes from before. Denies obstructive symptoms. Can start doing Ultrasound Q 2 years.  She will be due for ultrasound in November of 2024.     3. SIADH- . Being followed by nephrology.  Sodium stable.     4. Osteoporosis- has had a pelvis fracture. On Prolia. Taking Ca + D. Discussed fall precautions.  Discussed importance of weight-bearing exercise.  Therapy plan placed.  Check bone density at follow-up.     FOLLOWUP  F/U 6 months with Renal Panel, DEXA, prior to next prolia.

## 2023-05-11 ENCOUNTER — INFUSION (OUTPATIENT)
Dept: INFUSION THERAPY | Facility: HOSPITAL | Age: 78
End: 2023-05-11
Attending: INTERNAL MEDICINE
Payer: MEDICARE

## 2023-05-11 VITALS
SYSTOLIC BLOOD PRESSURE: 103 MMHG | TEMPERATURE: 97 F | DIASTOLIC BLOOD PRESSURE: 63 MMHG | HEART RATE: 73 BPM | RESPIRATION RATE: 18 BRPM

## 2023-05-11 DIAGNOSIS — M81.8 OTHER OSTEOPOROSIS WITHOUT CURRENT PATHOLOGICAL FRACTURE: Primary | ICD-10-CM

## 2023-05-11 PROCEDURE — 63600175 PHARM REV CODE 636 W HCPCS: Mod: JZ,JG,PN

## 2023-05-11 PROCEDURE — 96372 THER/PROPH/DIAG INJ SC/IM: CPT | Mod: PN

## 2023-05-11 RX ORDER — DIPHENHYDRAMINE HCL 25 MG
25 CAPSULE ORAL
Status: CANCELLED | OUTPATIENT
Start: 2023-05-31 | End: 2023-05-31

## 2023-05-11 RX ORDER — SODIUM CHLORIDE 0.9 % (FLUSH) 0.9 %
10 SYRINGE (ML) INJECTION
Status: CANCELLED
Start: 2023-05-31 | End: 2023-06-01

## 2023-05-11 RX ORDER — ACETAMINOPHEN 500 MG
1000 TABLET ORAL
Status: CANCELLED | OUTPATIENT
Start: 2023-05-31 | End: 2023-05-31

## 2023-05-11 RX ADMIN — DENOSUMAB 60 MG: 60 INJECTION SUBCUTANEOUS at 09:05

## 2023-07-19 RX ORDER — PREDNISONE 5 MG/1
TABLET ORAL
Qty: 15 TABLET | Refills: 0 | Status: SHIPPED | OUTPATIENT
Start: 2023-07-19 | End: 2023-09-08 | Stop reason: SDUPTHER

## 2023-07-19 NOTE — TELEPHONE ENCOUNTER
----- Message from Vani Anaya sent at 7/19/2023  3:34 PM CDT -----  Contact: Patient  Type:  RX Refill Request    Who Called:  Patient  Refill or New Rx:  Refill (she won't get another refill for 5 days in the mail and needs it asap)  RX Name and Strength:  predniSONE (DELTASONE) 5 MG tablet   How is the patient currently taking it? (ex. 1XDay):  Take 1 tablet (5 mg total) by mouth once daily. - Oral   Is this a 30 day or 90 day RX:  15 was requested   Preferred Pharmacy with phone number:    [place], [place] - [address]  [address] [address]  Phone: [phone] Fax: [phone]  Local or Mail Order:  Local  Ordering Provider:  Omar Mccray Call Back Number:  [phone]  Additional Information:  Please call the pt back to advise. Thanks!

## 2023-09-08 RX ORDER — PREDNISONE 5 MG/1
TABLET ORAL
Qty: 15 TABLET | Refills: 0 | Status: SHIPPED | OUTPATIENT
Start: 2023-09-08 | End: 2023-10-05 | Stop reason: SDUPTHER

## 2023-09-15 ENCOUNTER — OFFICE VISIT (OUTPATIENT)
Dept: OTOLARYNGOLOGY | Facility: CLINIC | Age: 78
End: 2023-09-15
Payer: MEDICARE

## 2023-09-15 VITALS — WEIGHT: 95.69 LBS | HEIGHT: 64 IN | BODY MASS INDEX: 16.33 KG/M2

## 2023-09-15 DIAGNOSIS — K11.7 EXCESSIVE SALIVATION: ICD-10-CM

## 2023-09-15 PROCEDURE — 1101F PR PT FALLS ASSESS DOC 0-1 FALLS W/OUT INJ PAST YR: ICD-10-PCS | Mod: CPTII,S$GLB,, | Performed by: STUDENT IN AN ORGANIZED HEALTH CARE EDUCATION/TRAINING PROGRAM

## 2023-09-15 PROCEDURE — 99214 OFFICE O/P EST MOD 30 MIN: CPT | Mod: S$GLB,,, | Performed by: STUDENT IN AN ORGANIZED HEALTH CARE EDUCATION/TRAINING PROGRAM

## 2023-09-15 PROCEDURE — 1159F PR MEDICATION LIST DOCUMENTED IN MEDICAL RECORD: ICD-10-PCS | Mod: CPTII,S$GLB,, | Performed by: STUDENT IN AN ORGANIZED HEALTH CARE EDUCATION/TRAINING PROGRAM

## 2023-09-15 PROCEDURE — 1159F MED LIST DOCD IN RCRD: CPT | Mod: CPTII,S$GLB,, | Performed by: STUDENT IN AN ORGANIZED HEALTH CARE EDUCATION/TRAINING PROGRAM

## 2023-09-15 PROCEDURE — 99999 PR PBB SHADOW E&M-EST. PATIENT-LVL IV: CPT | Mod: PBBFAC,,, | Performed by: STUDENT IN AN ORGANIZED HEALTH CARE EDUCATION/TRAINING PROGRAM

## 2023-09-15 PROCEDURE — 1125F AMNT PAIN NOTED PAIN PRSNT: CPT | Mod: CPTII,S$GLB,, | Performed by: STUDENT IN AN ORGANIZED HEALTH CARE EDUCATION/TRAINING PROGRAM

## 2023-09-15 PROCEDURE — 99214 PR OFFICE/OUTPT VISIT, EST, LEVL IV, 30-39 MIN: ICD-10-PCS | Mod: S$GLB,,, | Performed by: STUDENT IN AN ORGANIZED HEALTH CARE EDUCATION/TRAINING PROGRAM

## 2023-09-15 PROCEDURE — 3288F FALL RISK ASSESSMENT DOCD: CPT | Mod: CPTII,S$GLB,, | Performed by: STUDENT IN AN ORGANIZED HEALTH CARE EDUCATION/TRAINING PROGRAM

## 2023-09-15 PROCEDURE — 1101F PT FALLS ASSESS-DOCD LE1/YR: CPT | Mod: CPTII,S$GLB,, | Performed by: STUDENT IN AN ORGANIZED HEALTH CARE EDUCATION/TRAINING PROGRAM

## 2023-09-15 PROCEDURE — 99999 PR PBB SHADOW E&M-EST. PATIENT-LVL IV: ICD-10-PCS | Mod: PBBFAC,,, | Performed by: STUDENT IN AN ORGANIZED HEALTH CARE EDUCATION/TRAINING PROGRAM

## 2023-09-15 PROCEDURE — 3288F PR FALLS RISK ASSESSMENT DOCUMENTED: ICD-10-PCS | Mod: CPTII,S$GLB,, | Performed by: STUDENT IN AN ORGANIZED HEALTH CARE EDUCATION/TRAINING PROGRAM

## 2023-09-15 PROCEDURE — 1125F PR PAIN SEVERITY QUANTIFIED, PAIN PRESENT: ICD-10-PCS | Mod: CPTII,S$GLB,, | Performed by: STUDENT IN AN ORGANIZED HEALTH CARE EDUCATION/TRAINING PROGRAM

## 2023-09-15 RX ORDER — GLYCOPYRROLATE 1 MG/1
1 TABLET ORAL 2 TIMES DAILY
Qty: 60 TABLET | Refills: 11 | Status: SHIPPED | OUTPATIENT
Start: 2023-09-15 | End: 2023-12-15

## 2023-09-15 NOTE — PROGRESS NOTES
Otolaryngology Clinic Note    Subjective:       Patient ID: Telma Fraser is a 78 y.o. female.    Chief Complaint: Sore Throat      History of Present Illness: Telma Fraser is a 78 y.o. female presenting with excess salivation for past 3 months. Asked PCP for something for this, advised to see ENT. Around mealtime with drool all the time. Drools constantly. Keeps tissue on her all the time, drools at night and during day. No ulceration or irritation from this. No octavia med changes. She does take allegra, long list of meds. Moves her tongue around a lot to suck drool back in. Nose runs all day too. Has tried reflux medicine and no change. Atrovent does not help nose past 2 hours. Does not help with drool.       Past Surgical History:   Procedure Laterality Date    analplasty      for hemorrhoids    APPENDECTOMY      CARDIAC ELECTROPHYSIOLOGY MAPPING AND ABLATION  3/14/16    COLONOSCOPY N/A 1/9/2019    Procedure: COLONOSCOPY;  Surgeon: Don Medrano MD;  Location: [facility];  Service: Endoscopy;  Laterality: N/A;    COLONOSCOPY N/A 2/23/2022    Procedure: COLONOSCOPY;  Surgeon: Don Medrano MD;  Location: [facility];  Service: Endoscopy;  Laterality: N/A;    ESOPHAGOGASTRODUODENOSCOPY N/A 2/23/2022    Procedure: EGD (ESOPHAGOGASTRODUODENOSCOPY);  Surgeon: Don Medrano MD;  Location: [facility];  Service: Endoscopy;  Laterality: N/A;    GASTROPARESIS      JOINT REPLACEMENT Right     KNEE ARTHROPLASTY Right     right TKA    KNEE ARTHROSCOPY Left     LEFT SCOPE 6/7/04 10/14/11    left thumb      left wrist      right knee      right wrist      SINUS SURGERY      TUBAL LIGATION       Past Medical History:   Diagnosis Date    Achalasia of esophagus     Acquired hypogammaglobulinemia     Adrenal cortical hypofunction     Adrenal insufficiency 06/22/2016 6/22/16 Dr. Jean Nelson Currently Has Her On Prednisone 5 Mg Daily    Anemia due to chronic blood loss 12/13/2017    Anemia, chronic disease  12/13/2017    Anemia, chronic renal failure 12/13/2017    Anxiety     Asthma     Asthma without status asthmaticus     Atrial fibrillation     Chronic gastritis     mild    Closed fracture of left inferior pubic ramus 9/29/2018    COPD (chronic obstructive pulmonary disease)     Depression     Diplopia     Diseases of tricuspid valve     Dizziness 06/22/2016    Early satiety 06/22/2016    Fall 08/2019    Fall 12/08/2019    left wrist injury    Fall 11/2020    with injury    H/O: GI bleed 12/13/2017    History of colon polyps     Hyperlipemia     Hyperlipidemia     Hypertension     Hyposmolality and/or hyponatremia     Hypothyroidism     Insomnia, unspecified     Mitral valve disorders(424.0)     Nausea And Vomiting 06/22/2016    Nephrogenous proteinuria     Other abnormal glucose     Senile osteoporosis     SIADH (syndrome of inappropriate ADH production)     TIA (transient ischemic attack)     Vitamin D deficiency     Tor Parkinson White pattern seen on electrocardiogram      Social Determinants of Health     Tobacco Use: Medium Risk (9/15/2023)    Patient History     Smoking Tobacco Use: Former     Smokeless Tobacco Use: Never     Passive Exposure: Not on file   Alcohol Use: Not At Risk (11/28/2018)    AUDIT-C     Frequency of Alcohol Consumption: Never     Average Number of Drinks: Not on file     Frequency of Binge Drinking: Not on file   Financial Resource Strain: Not on file   Food Insecurity: Not on file   Transportation Needs: Not on file   Physical Activity: Not on file   Stress: No Stress Concern Present (6/9/2020)    [employer] [place] [employer] - Occupational Stress Questionnaire     Feeling of Stress : Only a little   Social Connections: Not on file   Housing Stability: Not on file   Depression: Low Risk  (2/28/2022)    Depression     Last PHQ-4: Flowsheet Data: 0     Review of patient's allergies indicates:   Allergen Reactions    Fiorinal [butalbital-aspirin-caffeine] Hives    Gabapentin  Other (See Comments)     shakes    Sulfa (sulfonamide antibiotics) Anaphylaxis    Ceftin [cefuroxime axetil]      PT STATES PILL IS TOO HARD TO SWALLOW     Doxycycline Nausea And Vomiting     Tore up her stomach    Evista [raloxifene] Other (See Comments)     Shortness of breath    Lyrica [pregabalin] Other (See Comments)     Shortness of breath    Rosuvastatin      Muscle pain     Current Outpatient Medications   Medication Instructions    acyclovir (ZOVIRAX) 800 mg, Oral, 3 times daily    albuterol (PROVENTIL/VENTOLIN HFA) 90 mcg/actuation inhaler 2 puffs, Inhalation, Every 6 hours PRN    ALPRAZolam (XANAX) 0.5 mg, Oral, 3 times daily PRN    apixaban (ELIQUIS) 5 mg, Oral, 2 times daily    atorvastatin (LIPITOR) 40 mg, Oral, Nightly    azelastine (ASTELIN) 274 mcg, Nasal, 2 times daily    biotin 1,000 mcg Chew 1 tablet, Oral, Daily    calcium carbonate (OS-MALIK) 500 mg calcium (1,250 mg) tablet 2 tablets, Oral, Daily    cholecalciferol (vitamin D3) (VITAMIN D3) 1,000 Units, Oral, Daily    cyanocobalamin (VITAMIN B-12) 100 mcg, Oral, Daily    denosumab (PROLIA) 60 mg, Subcutaneous, Every 6 months    estradioL (ESTRACE) 0.01 % (0.1 mg/gram) vaginal cream Vaginal    famotidine (PEPCID) 20 mg, Oral, 2 times daily    fentaNYL (DURAGESIC) 25 mcg/hr 1 patch, Transdermal, Every 72 hours    fexofenadine (ALLEGRA) 60 mg, Oral, Daily    fluticasone propionate (FLOVENT HFA) 44 mcg/actuation inhaler 1 puff, Inhalation, 2 times daily, Controller    fluticasone-umeclidin-vilanter (TRELEGY ELLIPTA) 200-62.5-25 mcg inhaler 1 puff, Inhalation, Daily    HYDROcodone-acetaminophen (NORCO) 5-325 mg per tablet 1 tablet, Oral, 2 times daily PRN    IMMUN GLOB G,IGG,/PRO/IGA 0-50 (PRIVIGEN IV) 25 mg, Intravenous, Every 30 days    ipratropium (ATROVENT) 42 mcg (0.06 %) nasal spray USE 1-2 SPRAYS IN EACH NOSTRIL 2-3 TIMES DAILY    iron-vit c-vit b12-folic acid (IRON-C PLUS) tablet 1 tablet, Oral, Daily    lisinopriL (PRINIVIL,ZESTRIL) 2.5 MG  tablet lisinopril 2.5 mg tablet   TAKE 1 TABLET (2.5 MG TOTAL) BY MOUTH (ONE) TIME EACH DAY    metoclopramide HCl (REGLAN) 10 mg, Oral, 3 times daily before meals    metoprolol succinate (TOPROL-XL) 12.5 mg, Oral, 2 times daily    pantoprazole (PROTONIX) 40 mg, Oral, 2 times daily    predniSONE (DELTASONE) 5 MG tablet Take 1 tablet (5 mg total) by mouth once daily.    primidone (MYSOLINE) 250 MG Tab Oral, Nightly    propafenone (RYTHMOL) 225 mg, Oral, 2 times daily    vitamin E 400 Units, Oral, Daily    zonisamide (ZONEGRAN) 200 mg, Oral, Nightly         ENT ROS negative except as stated above.     Patient answers are not available for this visit.            Objective:      There were no vitals filed for this visit.    General: NAD, well appearing  Eyes: Normal conjunctiva and lids  Face: symmetric, nerve intact  Nose: The nose is without any evidence of any deformity. The nasal mucosa is moist with clear rhinorrhea. The septum is midline. There is no evidence of septal hematoma. The turbinates are without abnormality.   Ears: The ears are with normal-appearing pinna. Examination of the canals is normal appearing bilaterally. There is no drainage or erythema noted. The tympanic membranes are normal appearing with pearly color, normal-appearing landmarks and normal light reflex. Hearing is grossly intact.  Mouth: No obvious abnormalities to the lips. The teeth are unremarkable. The gingivae are without any obvious evidence of infection or lesion. The oral mucosa is moist and pink. There are no obvious masses to the hard or soft palate.   Oropharynx: The uvula is midline.  The tongue is midline with some cobblestoning changes. The posterior pharynx is without erythema or exudate. The tonsils are normal appearing.  Salivary glands: The salivary glands are symmetric and not enlarged, no masses  Neck: No lymphadenopathy, trachea midline, thryoid not enlarged.  Psych: Normal mood and affect.   Neuro: Grossly  intact  Speech: fluent       Assessment and Plan:       1. Excessive salivation        Trial robinul, twice a day    Can cause hyperthermia and heat stroke in hot environments. Other observed adverse effects include dry mouth, difficulty urinating, headaches, diarrhea, and constipation. Monitor heart rate and maintain adequate hydration to avoid adverse events.    RTC: 3 months    Plan of care was discussed in detail with the patient, who agreed with the plan as above. All questions were answered in detail.     Kirsty Raymond MD  Otolaryngology

## 2023-09-28 PROBLEM — G89.4 CHRONIC PAIN SYNDROME: Status: ACTIVE | Noted: 2023-09-28

## 2023-09-28 PROBLEM — I20.9 ANGINA PECTORIS, UNSPECIFIED: Status: ACTIVE | Noted: 2023-09-28

## 2023-10-05 RX ORDER — PREDNISONE 5 MG/1
TABLET ORAL
Qty: 90 TABLET | Refills: 0 | Status: SHIPPED | OUTPATIENT
Start: 2023-10-05 | End: 2023-11-06 | Stop reason: SDUPTHER

## 2023-10-05 NOTE — TELEPHONE ENCOUNTER
S/w pt states she only got 15 tabs for her prednisone. She states she needs 90 day supply to be send to [facility]

## 2023-10-05 NOTE — TELEPHONE ENCOUNTER
----- Message from Rabia Ross sent at 10/5/2023 10:34 AM CDT -----  Type: Needs Medical Advice  Who Called:  pt    Best Call Back Number: [phone]    Additional Information: pt calling in regards to her prescriptions , says she only received 15 and wanted 90 please advise

## 2023-10-31 ENCOUNTER — HOSPITAL ENCOUNTER (OUTPATIENT)
Dept: RADIOLOGY | Facility: HOSPITAL | Age: 78
Discharge: HOME | End: 2023-10-31
Attending: INTERNAL MEDICINE
Payer: MEDICARE

## 2023-10-31 DIAGNOSIS — E22.2 SIADH (SYNDROME OF INAPPROPRIATE ADH PRODUCTION): ICD-10-CM

## 2023-10-31 DIAGNOSIS — E27.40 ADRENAL INSUFFICIENCY: ICD-10-CM

## 2023-10-31 DIAGNOSIS — E04.2 MULTINODULAR GOITER: ICD-10-CM

## 2023-10-31 DIAGNOSIS — M81.0 OSTEOPOROSIS, UNSPECIFIED OSTEOPOROSIS TYPE, UNSPECIFIED PATHOLOGICAL FRACTURE PRESENCE: ICD-10-CM

## 2023-10-31 PROCEDURE — 77080 DXA BONE DENSITY AXIAL: CPT | Mod: TC,PO

## 2023-10-31 PROCEDURE — 77080 DXA BONE DENSITY AXIAL SKELETON 1 OR MORE SITES: ICD-10-PCS | Mod: 26,,, | Performed by: RADIOLOGY

## 2023-10-31 PROCEDURE — 77080 DXA BONE DENSITY AXIAL: CPT | Mod: 26,,, | Performed by: RADIOLOGY

## 2023-11-06 ENCOUNTER — OFFICE VISIT (OUTPATIENT)
Dept: ENDOCRINOLOGY | Facility: CLINIC | Age: 78
End: 2023-11-06
Payer: MEDICARE

## 2023-11-06 VITALS
HEIGHT: 64 IN | WEIGHT: 94.81 LBS | BODY MASS INDEX: 16.19 KG/M2 | DIASTOLIC BLOOD PRESSURE: 64 MMHG | HEART RATE: 70 BPM | OXYGEN SATURATION: 99 % | SYSTOLIC BLOOD PRESSURE: 114 MMHG

## 2023-11-06 DIAGNOSIS — M81.0 OSTEOPOROSIS, UNSPECIFIED OSTEOPOROSIS TYPE, UNSPECIFIED PATHOLOGICAL FRACTURE PRESENCE: Primary | ICD-10-CM

## 2023-11-06 DIAGNOSIS — E27.40 ADRENAL INSUFFICIENCY: Primary | ICD-10-CM

## 2023-11-06 DIAGNOSIS — R63.4 WEIGHT LOSS: ICD-10-CM

## 2023-11-06 DIAGNOSIS — M81.0 OSTEOPOROSIS, UNSPECIFIED OSTEOPOROSIS TYPE, UNSPECIFIED PATHOLOGICAL FRACTURE PRESENCE: ICD-10-CM

## 2023-11-06 DIAGNOSIS — E04.2 MULTINODULAR GOITER: ICD-10-CM

## 2023-11-06 PROCEDURE — 3288F FALL RISK ASSESSMENT DOCD: CPT | Mod: CPTII,S$GLB,, | Performed by: INTERNAL MEDICINE

## 2023-11-06 PROCEDURE — 3074F PR MOST RECENT SYSTOLIC BLOOD PRESSURE < 130 MM HG: ICD-10-PCS | Mod: CPTII,S$GLB,, | Performed by: INTERNAL MEDICINE

## 2023-11-06 PROCEDURE — 3074F SYST BP LT 130 MM HG: CPT | Mod: CPTII,S$GLB,, | Performed by: INTERNAL MEDICINE

## 2023-11-06 PROCEDURE — 99999 PR PBB SHADOW E&M-EST. PATIENT-LVL V: ICD-10-PCS | Mod: PBBFAC,,, | Performed by: INTERNAL MEDICINE

## 2023-11-06 PROCEDURE — 3288F PR FALLS RISK ASSESSMENT DOCUMENTED: ICD-10-PCS | Mod: CPTII,S$GLB,, | Performed by: INTERNAL MEDICINE

## 2023-11-06 PROCEDURE — 1101F PT FALLS ASSESS-DOCD LE1/YR: CPT | Mod: CPTII,S$GLB,, | Performed by: INTERNAL MEDICINE

## 2023-11-06 PROCEDURE — 1101F PR PT FALLS ASSESS DOC 0-1 FALLS W/OUT INJ PAST YR: ICD-10-PCS | Mod: CPTII,S$GLB,, | Performed by: INTERNAL MEDICINE

## 2023-11-06 PROCEDURE — 99999 PR PBB SHADOW E&M-EST. PATIENT-LVL V: CPT | Mod: PBBFAC,,, | Performed by: INTERNAL MEDICINE

## 2023-11-06 PROCEDURE — 1160F RVW MEDS BY RX/DR IN RCRD: CPT | Mod: CPTII,S$GLB,, | Performed by: INTERNAL MEDICINE

## 2023-11-06 PROCEDURE — 1126F PR PAIN SEVERITY QUANTIFIED, NO PAIN PRESENT: ICD-10-PCS | Mod: CPTII,S$GLB,, | Performed by: INTERNAL MEDICINE

## 2023-11-06 PROCEDURE — 1159F MED LIST DOCD IN RCRD: CPT | Mod: CPTII,S$GLB,, | Performed by: INTERNAL MEDICINE

## 2023-11-06 PROCEDURE — 1160F PR REVIEW ALL MEDS BY PRESCRIBER/CLIN PHARMACIST DOCUMENTED: ICD-10-PCS | Mod: CPTII,S$GLB,, | Performed by: INTERNAL MEDICINE

## 2023-11-06 PROCEDURE — 1126F AMNT PAIN NOTED NONE PRSNT: CPT | Mod: CPTII,S$GLB,, | Performed by: INTERNAL MEDICINE

## 2023-11-06 PROCEDURE — 1159F PR MEDICATION LIST DOCUMENTED IN MEDICAL RECORD: ICD-10-PCS | Mod: CPTII,S$GLB,, | Performed by: INTERNAL MEDICINE

## 2023-11-06 PROCEDURE — 99214 PR OFFICE/OUTPT VISIT, EST, LEVL IV, 30-39 MIN: ICD-10-PCS | Mod: S$GLB,,, | Performed by: INTERNAL MEDICINE

## 2023-11-06 PROCEDURE — 99214 OFFICE O/P EST MOD 30 MIN: CPT | Mod: S$GLB,,, | Performed by: INTERNAL MEDICINE

## 2023-11-06 PROCEDURE — 3078F DIAST BP <80 MM HG: CPT | Mod: CPTII,S$GLB,, | Performed by: INTERNAL MEDICINE

## 2023-11-06 PROCEDURE — 3078F PR MOST RECENT DIASTOLIC BLOOD PRESSURE < 80 MM HG: ICD-10-PCS | Mod: CPTII,S$GLB,, | Performed by: INTERNAL MEDICINE

## 2023-11-06 RX ORDER — PREDNISONE 5 MG/1
TABLET ORAL
Qty: 30 TABLET | Refills: 6 | Status: SHIPPED | OUTPATIENT
Start: 2023-11-06 | End: 2024-01-31

## 2023-11-06 RX ORDER — PREDNISONE 1 MG/1
TABLET ORAL
Qty: 60 TABLET | Refills: 6 | Status: SHIPPED | OUTPATIENT
Start: 2023-11-06 | End: 2023-12-17 | Stop reason: CLARIF

## 2023-11-06 NOTE — PROGRESS NOTES
CHIEF COMPLAINT: Adrenal Insufficiency/ Osteoporosis/ SIADH/ Multinodular Goiter.   78 y.o.   being seen as a f/u. She had a benign FNA 3/21/13. 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 She is seeing nephrology. Started on a trial of hydrocortisone for possible adrenal insufficiency (could not R/O based on cosyntropin stim test and having fatigue). On prednisone 5 mg currently. Had a fracture of the pelvis Sept 2018 after a fall when walking to the bathroom.       Prolia scheduled 11/10/2023. Tolerating Prolia. Taking Ca + D. No new falls. No new fractures. Not exercising. Wearing medic alert bracelet. Has not required sick day precautions.she is continuing to lose weight. Has not done a calorie count. Has a broken tooth that may impact eating. NO XRT to head/neck. No difficulty swallowing.                 PAST MEDICAL HISTORY: Asthma, WPW, depression/anxiety, hyperlipidemia.      PAST SURGICAL HISTORY: right knee replacement. Hand surgery.      SOCIAL HISTORY: No T/A      FAMILY HISTORY: No known thyroid disease or thyroid cancer. Dementia.      MEDICATIONS/ALLERGIES: The patient's MedCard has been updated and reviewed.         PE:   GENERAL: Well developed, well nourished.   NECK: Supple, trachea midline, Left nodule palpable. No LAD   CHEST: Resp even and unlabored, CTA bilateral.   CARDIAC: RRR, S1, S2 heard, no murmurs, rubs, S3, or S4          Latest Reference Range & Units 10/31/23 09:33   Sodium 136 - 145 mmol/L 137   Potassium 3.5 - 5.1 mmol/L 3.5   Chloride 95 - 110 mmol/L 102   CO2 23 - 29 mmol/L 23   Anion Gap 8 - 16 mmol/L 12   BUN 8 - 23 mg/dL 12   Creatinine 0.5 - 1.4 mg/dL 0.9   eGFR >60 mL/min/1.73 m^2 >60.0   Glucose 70 - 110 mg/dL 102   Calcium 8.7 - 10.5 mg/dL 9.4   Phosphorus Level 2.7 - 4.5 mg/dL 4.7 (H)   Albumin 3.5 - 5.2 g/dL 3.1 (L)   (H): Data is abnormally high  (L): Data is abnormally low    Unspecified adrenocortical insufficiency     TECHNIQUE:  DXA scanning was performed over the  left hip and lumbar spine.  Review of the images confirms satisfactory positioning and technique.     COMPARISON:  10/27/2021     FINDINGS:  The L1 to L4 vertebral bone mineral density is equal to 0.981 g/cm squared with a T score of -0.6.  There has been a 2.5% increase relative to the prior study.     The left femoral neck bone mineral density is equal to 0.509 g/cm squared with a T score of -3.1.  There has been  a 0.7% decrease relative to the prior study.     The total hip bone mineral density is equal to 0.600 g/cm squared with a T score of -2.8.  There has been a 6.4% decrease relative to the prior study.     There is a 24% risk of a major osteoporotic fracture and a 9.4% risk of hip fracture in the next 10 years (FRAX).     Impression:     Osteoporosis    ASSESSMENT/PLAN:   1. Adrenal Insuffiency- Labs borderline for AI. Since having some symptoms that could be AI related decided to do a trial of steroids. Repeat testing could not rule out AI and symptoms improved with steroids, so continued steroids. Initially on Hydrocortisone, but could not tolerate. On prednisone and tolerating well. Discussed sick day precautions.  She is wearing medical alert bracelet.  She has been losing weight.  Unsure if related to dosing of steroids versus another cause.  We will try prednisone 7 mg daily.  However, we need to monitor closely as excess steroids can worsen osteoporosis which I did discuss with patient.  Also advise that she do a calorie count.  Although she currently is having some dental issues, it appears that the weight loss was prior to the dental issues.      2. Multinodular goiter-S/P benign FNA of left nodule. US shows no significant changes from before. Denies obstructive symptoms. Can start doing Ultrasound Q 2 years.  She will be due for ultrasound in November of 2024.     3. SIADH- . Being followed by nephrology.  Sodium stable.     4. Osteoporosis- has had a pelvis fracture. On Prolia. Taking Ca + D.  Discussed fall precautions.  Discussed importance of weight-bearing exercise.  Therapy plan placed.      5.  Malabsorption-has been losing weight for some time. See #1.  If no improvement with steroids and calorie count seems appropriate, may also need malabsorption workup     FOLLOWUP  3 months with CMP and check weight  F/U 6 months with Renal Panel prior to next prolia.

## 2023-11-10 ENCOUNTER — INFUSION (OUTPATIENT)
Dept: INFUSION THERAPY | Facility: HOSPITAL | Age: 78
End: 2023-11-10
Attending: INTERNAL MEDICINE
Payer: MEDICARE

## 2023-11-10 VITALS
WEIGHT: 94.81 LBS | BODY MASS INDEX: 16.27 KG/M2 | DIASTOLIC BLOOD PRESSURE: 65 MMHG | SYSTOLIC BLOOD PRESSURE: 102 MMHG | HEART RATE: 72 BPM

## 2023-11-10 DIAGNOSIS — M81.8 OTHER OSTEOPOROSIS WITHOUT CURRENT PATHOLOGICAL FRACTURE: ICD-10-CM

## 2023-11-10 DIAGNOSIS — D83.0 BAFF RECEPTOR DEFICIENCY: Primary | ICD-10-CM

## 2023-11-10 PROCEDURE — 96372 THER/PROPH/DIAG INJ SC/IM: CPT | Mod: PN

## 2023-11-10 PROCEDURE — 63600175 PHARM REV CODE 636 W HCPCS: Mod: JZ,JG,PN | Performed by: INTERNAL MEDICINE

## 2023-11-10 RX ORDER — ACETAMINOPHEN 500 MG
1000 TABLET ORAL
Status: CANCELLED | OUTPATIENT
Start: 2023-11-29 | End: 2023-11-29

## 2023-11-10 RX ORDER — DIPHENHYDRAMINE HCL 25 MG
25 CAPSULE ORAL
Status: DISCONTINUED | OUTPATIENT
Start: 2023-11-10 | End: 2023-11-10

## 2023-11-10 RX ORDER — DIPHENHYDRAMINE HCL 25 MG
25 CAPSULE ORAL
Status: CANCELLED | OUTPATIENT
Start: 2023-11-29 | End: 2023-11-29

## 2023-11-10 RX ORDER — SODIUM CHLORIDE 0.9 % (FLUSH) 0.9 %
10 SYRINGE (ML) INJECTION
Status: CANCELLED
Start: 2023-11-29 | End: 2023-11-30

## 2023-11-10 RX ORDER — SODIUM CHLORIDE 0.9 % (FLUSH) 0.9 %
10 SYRINGE (ML) INJECTION
Status: DISCONTINUED | OUTPATIENT
Start: 2023-11-10 | End: 2023-11-10 | Stop reason: HOSPADM

## 2023-11-10 RX ORDER — ACETAMINOPHEN 500 MG
1000 TABLET ORAL
Status: DISCONTINUED | OUTPATIENT
Start: 2023-11-10 | End: 2023-11-10

## 2023-11-10 RX ADMIN — DENOSUMAB 60 MG: 60 INJECTION SUBCUTANEOUS at 10:11

## 2023-11-21 PROBLEM — R29.898 WEAKNESS OF BOTH LOWER EXTREMITIES: Status: ACTIVE | Noted: 2023-11-21

## 2023-11-21 PROBLEM — R26.89 BALANCE PROBLEMS: Status: ACTIVE | Noted: 2023-11-21

## 2023-12-15 ENCOUNTER — OFFICE VISIT (OUTPATIENT)
Dept: OTOLARYNGOLOGY | Facility: CLINIC | Age: 78
End: 2023-12-15
Payer: MEDICARE

## 2023-12-15 VITALS — WEIGHT: 97.69 LBS | BODY MASS INDEX: 16.68 KG/M2 | HEIGHT: 64 IN

## 2023-12-15 DIAGNOSIS — J02.9 SORE THROAT: ICD-10-CM

## 2023-12-15 DIAGNOSIS — K11.7 EXCESSIVE SALIVATION: Primary | ICD-10-CM

## 2023-12-15 PROCEDURE — 99999 PR PBB SHADOW E&M-EST. PATIENT-LVL IV: ICD-10-PCS | Mod: PBBFAC,,, | Performed by: STUDENT IN AN ORGANIZED HEALTH CARE EDUCATION/TRAINING PROGRAM

## 2023-12-15 PROCEDURE — 3288F PR FALLS RISK ASSESSMENT DOCUMENTED: ICD-10-PCS | Mod: CPTII,S$GLB,, | Performed by: STUDENT IN AN ORGANIZED HEALTH CARE EDUCATION/TRAINING PROGRAM

## 2023-12-15 PROCEDURE — 1159F MED LIST DOCD IN RCRD: CPT | Mod: CPTII,S$GLB,, | Performed by: STUDENT IN AN ORGANIZED HEALTH CARE EDUCATION/TRAINING PROGRAM

## 2023-12-15 PROCEDURE — 99214 OFFICE O/P EST MOD 30 MIN: CPT | Mod: S$GLB,,, | Performed by: STUDENT IN AN ORGANIZED HEALTH CARE EDUCATION/TRAINING PROGRAM

## 2023-12-15 PROCEDURE — 1101F PT FALLS ASSESS-DOCD LE1/YR: CPT | Mod: CPTII,S$GLB,, | Performed by: STUDENT IN AN ORGANIZED HEALTH CARE EDUCATION/TRAINING PROGRAM

## 2023-12-15 PROCEDURE — 1101F PR PT FALLS ASSESS DOC 0-1 FALLS W/OUT INJ PAST YR: ICD-10-PCS | Mod: CPTII,S$GLB,, | Performed by: STUDENT IN AN ORGANIZED HEALTH CARE EDUCATION/TRAINING PROGRAM

## 2023-12-15 PROCEDURE — 99214 PR OFFICE/OUTPT VISIT, EST, LEVL IV, 30-39 MIN: ICD-10-PCS | Mod: S$GLB,,, | Performed by: STUDENT IN AN ORGANIZED HEALTH CARE EDUCATION/TRAINING PROGRAM

## 2023-12-15 PROCEDURE — 1159F PR MEDICATION LIST DOCUMENTED IN MEDICAL RECORD: ICD-10-PCS | Mod: CPTII,S$GLB,, | Performed by: STUDENT IN AN ORGANIZED HEALTH CARE EDUCATION/TRAINING PROGRAM

## 2023-12-15 PROCEDURE — 99999 PR PBB SHADOW E&M-EST. PATIENT-LVL IV: CPT | Mod: PBBFAC,,, | Performed by: STUDENT IN AN ORGANIZED HEALTH CARE EDUCATION/TRAINING PROGRAM

## 2023-12-15 PROCEDURE — 1126F AMNT PAIN NOTED NONE PRSNT: CPT | Mod: CPTII,S$GLB,, | Performed by: STUDENT IN AN ORGANIZED HEALTH CARE EDUCATION/TRAINING PROGRAM

## 2023-12-15 PROCEDURE — 1126F PR PAIN SEVERITY QUANTIFIED, NO PAIN PRESENT: ICD-10-PCS | Mod: CPTII,S$GLB,, | Performed by: STUDENT IN AN ORGANIZED HEALTH CARE EDUCATION/TRAINING PROGRAM

## 2023-12-15 PROCEDURE — 3288F FALL RISK ASSESSMENT DOCD: CPT | Mod: CPTII,S$GLB,, | Performed by: STUDENT IN AN ORGANIZED HEALTH CARE EDUCATION/TRAINING PROGRAM

## 2023-12-15 RX ORDER — GLYCOPYRROLATE 2 MG/1
2 TABLET ORAL 3 TIMES DAILY
Qty: 270 TABLET | Refills: 3 | Status: SHIPPED | OUTPATIENT
Start: 2023-12-15 | End: 2024-12-14

## 2023-12-15 RX ORDER — FLUDROCORTISONE ACETATE 0.1 MG/1
100 TABLET ORAL DAILY
COMMUNITY
Start: 2023-11-30 | End: 2024-02-16

## 2023-12-15 NOTE — PROGRESS NOTES
Otolaryngology Clinic Note    Subjective:       Patient ID: Telma Fraser is a 78 y.o. female.    Chief Complaint: Follow-up (Excessive salivation)      History of Present Illness: Telma Fraser is a 78 y.o. female presenting with excess salivation for past 3 months. Asked PCP for something for this, advised to see ENT. Around mealtime with drool all the time. Drools constantly. Keeps tissue on her all the time, drools at night and during day. No ulceration or irritation from this. No octavia med changes. She does take allegra, long list of meds. Moves her tongue around a lot to suck drool back in. Nose runs all day too. Has tried reflux medicine and no change. Atrovent does not help nose past 2 hours. Does not help with drool.     12/15/23: RTC. Has been on robinul. She thinks it helps for a few minutes, but not long. Maybe 30 minutes of benefit. Has had sore throat, worse in the morning. Feels raw and gets a tickle in her throat, causes dry cough. Has been past week or so. Is on reflux medicine- protonix BID. Robinul did not change sore throat. Nose not as runny as it was. Was using flovent. Uses albuterol. Drinks crystal light all day. Has fan in bedroom. No humidifier.       Past Surgical History:   Procedure Laterality Date    analplasty      for hemorrhoids    APPENDECTOMY      CARDIAC ELECTROPHYSIOLOGY MAPPING AND ABLATION  3/14/16    COLONOSCOPY N/A 1/9/2019    Procedure: COLONOSCOPY;  Surgeon: Don Medrano MD;  Location: [facility];  Service: Endoscopy;  Laterality: N/A;    COLONOSCOPY N/A 2/23/2022    Procedure: COLONOSCOPY;  Surgeon: Don Medrano MD;  Location: [facility];  Service: Endoscopy;  Laterality: N/A;    ESOPHAGOGASTRODUODENOSCOPY N/A 2/23/2022    Procedure: EGD (ESOPHAGOGASTRODUODENOSCOPY);  Surgeon: Don Medrano MD;  Location: [facility];  Service: Endoscopy;  Laterality: N/A;    GASTROPARESIS      JOINT REPLACEMENT Right     KNEE ARTHROPLASTY Right     right TKA     KNEE ARTHROSCOPY Left     LEFT SCOPE 6/7/04 10/14/11    left thumb      left wrist      right knee      right wrist      SINUS SURGERY      TUBAL LIGATION       Past Medical History:   Diagnosis Date    Achalasia of esophagus     Acquired hypogammaglobulinemia     Adrenal cortical hypofunction     Adrenal insufficiency 06/22/2016 6/22/16 Dr. Jean Nelson Currently Has Her On Prednisone 5 Mg Daily    Anemia due to chronic blood loss 12/13/2017    Anemia, chronic disease 12/13/2017    Anemia, chronic renal failure 12/13/2017    Anxiety     Asthma     Asthma without status asthmaticus     Atrial fibrillation     Chronic gastritis     mild    Closed fracture of left inferior pubic ramus 9/29/2018    COPD (chronic obstructive pulmonary disease)     Depression     Diplopia     Diseases of tricuspid valve     Dizziness 06/22/2016    Early satiety 06/22/2016    Fall 08/2019    Fall 12/08/2019    left wrist injury    Fall 11/2020    with injury    H/O: GI bleed 12/13/2017    History of colon polyps     Hyperlipemia     Hyperlipidemia     Hypertension     Hyposmolality and/or hyponatremia     Hypothyroidism     Insomnia, unspecified     Mitral valve disorders(424.0)     Nausea And Vomiting 06/22/2016    Nephrogenous proteinuria     Other abnormal glucose     Senile osteoporosis     SIADH (syndrome of inappropriate ADH production)     TIA (transient ischemic attack)     Vitamin D deficiency     Tor Parkinson White pattern seen on electrocardiogram      Social Determinants of Health     Tobacco Use: Medium Risk (12/15/2023)    Patient History     Smoking Tobacco Use: Former     Smokeless Tobacco Use: Never     Passive Exposure: Not on file   Alcohol Use: Not At Risk (11/28/2018)    AUDIT-C     Frequency of Alcohol Consumption: Never     Average Number of Drinks: Not on file     Frequency of Binge Drinking: Not on file   Financial Resource Strain: Not on file   Food Insecurity: Not on file   Transportation Needs: Not on file    Physical Activity: Not on file   Stress: No Stress Concern Present (6/9/2020)    [employer] - Occupational Stress Questionnaire     Feeling of Stress : Only a little   Social Connections: Not on file   Housing Stability: Not on file   Depression: Low Risk  (2/28/2022)    Depression     Last PHQ-4: Flowsheet Data: 0     Review of patient's allergies indicates:   Allergen Reactions    Bactrim [sulfamethoxazole-trimethoprim] Other (See Comments)     Vomiting    Butalbital-aspirin-caffeine Hives and Other (See Comments)    Gabapentin Other (See Comments)     shakes    Pregabalin Other (See Comments), Rash and Swelling     Shortness of breath  Shortness of breath  Shortness of breath      Raloxifene Other (See Comments) and Shortness Of Breath     Shortness of breath    Sulfa (sulfonamide antibiotics) Anaphylaxis    Ceftin [cefuroxime axetil]      PT STATES PILL IS TOO HARD TO SWALLOW     Doxycycline Nausea And Vomiting and Other (See Comments)     Tore up her stomach    Rosuvastatin Other (See Comments)     Muscle pain  Muscle pain     Current Outpatient Medications   Medication Instructions    acyclovir (ZOVIRAX) 800 mg, Oral, 3 times daily    albuterol (PROVENTIL/VENTOLIN HFA) 90 mcg/actuation inhaler 2 puffs, Inhalation, Every 6 hours PRN    ALPRAZolam (XANAX) 0.5 mg, Oral, 3 times daily PRN    apixaban (ELIQUIS) 5 mg, Oral, 2 times daily    atorvastatin (LIPITOR) 40 mg, Oral, Nightly    biotin 1,000 mcg Chew 1 tablet, Oral, Daily    calcium carbonate (OS-MALIK) 500 mg calcium (1,250 mg) tablet 2 tablets, Oral, Daily    cholecalciferol (vitamin D3) (VITAMIN D3) 1,000 Units, Oral, Daily    cyanocobalamin (VITAMIN B-12) 100 mcg, Oral, Daily    denosumab (PROLIA) 60 mg, Subcutaneous, Every 6 months    estradioL (ESTRACE) 0.01 % (0.1 mg/gram) vaginal cream Vaginal    famotidine (PEPCID) 20 mg, Oral, 2 times daily    fentaNYL (DURAGESIC) 25 mcg/hr 1 patch, Transdermal, Every 72 hours     fexofenadine (ALLEGRA) 60 mg, Oral, Daily    fludrocortisone (FLORINEF) 100 mcg, Oral    fluticasone propionate (FLOVENT HFA) 44 mcg/actuation inhaler 1 puff, Inhalation, 2 times daily, Controller    fluticasone-umeclidin-vilanter (TRELEGY ELLIPTA) 200-62.5-25 mcg inhaler 1 puff, Inhalation, Daily    GEMTESA 75 mg Tab 1 tablet, Oral    glycopyrrolate (ROBINUL) 2 mg, Oral, 3 times daily    IMMUN GLOB G,IGG,/PRO/IGA 0-50 (PRIVIGEN IV) 25 mg, Intravenous, Every 30 days    ipratropium (ATROVENT) 42 mcg (0.06 %) nasal spray USE 1-2 SPRAYS IN EACH NOSTRIL 2-3 TIMES DAILY    iron-vit c-vit b12-folic acid (IRON-C PLUS) tablet 1 tablet, Oral, Daily    lisinopriL (PRINIVIL,ZESTRIL) 2.5 MG tablet lisinopril 2.5 mg tablet   TAKE 1 TABLET (2.5 MG TOTAL) BY MOUTH (ONE) TIME EACH DAY    metoclopramide HCl (REGLAN) 10 mg, Oral, 3 times daily before meals    metoprolol succinate (TOPROL-XL) 12.5 mg, Oral, 2 times daily    mupirocin (BACTROBAN) 2 % ointment Topical (Top), 2 times daily    pantoprazole (PROTONIX) 40 mg, Oral, 2 times daily    predniSONE (DELTASONE) 1 MG tablet 5 mg + 2 mg daily    predniSONE (DELTASONE) 5 MG tablet 5 mg + 2 mg daily    primidone (MYSOLINE) 250 MG Tab Oral, Nightly    propafenone (RYTHMOL) 225 mg, Oral, 2 times daily    temazepam (RESTORIL) 30 mg, Oral, Nightly PRN    traMADoL (ULTRAM) 50 mg, Oral, 3 times daily    vitamin E 400 Units, Oral, Daily    zonisamide (ZONEGRAN) 200 mg, Oral, Nightly         ENT ROS negative except as stated above.     Patient answers are not available for this visit.            Objective:      There were no vitals filed for this visit.    General: NAD, well appearing  Eyes: Normal conjunctiva and lids  Face: symmetric, nerve intact  Nose: The nose is without any evidence of any deformity. The nasal mucosa is moist with clear rhinorrhea. The septum is midline. There is no evidence of septal hematoma. The turbinates are without abnormality.   Ears: The ears are with  normal-appearing pinna. Examination of the canals is normal appearing bilaterally. There is no drainage or erythema noted. The tympanic membranes are normal appearing with pearly color, normal-appearing landmarks and normal light reflex. Hearing is grossly intact.  Mouth: Raw at commissures. The teeth are unremarkable. The gingivae are without any obvious evidence of infection or lesion. The oral mucosa is moist and pink. There are no obvious masses to the hard or soft palate.   Oropharynx: The uvula is midline.  The tongue is midline with some cobblestoning changes. The posterior pharynx is without erythema or exudate. The tonsils are normal appearing.  Salivary glands: The salivary glands are symmetric and not enlarged, no masses  Neck: No lymphadenopathy, trachea midline, thryoid not enlarged.  Psych: Normal mood and affect.   Neuro: Grossly intact  Speech: fluent       Assessment and Plan:       1. Excessive salivation    2. Sore throat          Increase robinul dose and to TID from BID. Gets some benefit. If not sufficient, can try botox glands.   Does report some recent dry throat, could be weather change. Recommend humidifier, biotene spray, warm salt water gargles.     Can cause hyperthermia and heat stroke in hot environments. Other observed adverse effects include dry mouth, difficulty urinating, headaches, diarrhea, and constipation. Monitor heart rate and maintain adequate hydration to avoid adverse events.    RTC: 3 months    Plan of care was discussed in detail with the patient, who agreed with the plan as above. All questions were answered in detail.     Kirsty Raymond MD  Otolaryngology

## 2023-12-15 NOTE — PATIENT INSTRUCTIONS
For dry throat    Try warm salt water ( q quart water and 1 tsp salt) in morning  Try humidifier in bedroom  Try biotene throat spray    Increase robinul to three times daily. Monitor for constipation, heat intolerance.

## 2023-12-17 PROBLEM — R94.31 PROLONGED Q-T INTERVAL ON ECG: Status: ACTIVE | Noted: 2023-12-17

## 2023-12-17 PROBLEM — D64.9 ANEMIA: Status: ACTIVE | Noted: 2017-12-13

## 2023-12-17 PROBLEM — E43 SEVERE MALNUTRITION: Status: ACTIVE | Noted: 2019-08-09

## 2023-12-17 PROBLEM — E87.6 HYPOKALEMIA: Status: ACTIVE | Noted: 2023-12-17

## 2023-12-18 ENCOUNTER — TELEPHONE (OUTPATIENT)
Dept: ENDOCRINOLOGY | Facility: CLINIC | Age: 78
End: 2023-12-18
Payer: MEDICARE

## 2023-12-18 NOTE — TELEPHONE ENCOUNTER
----- Message from Yuriy Fuentes sent at 12/18/2023 10:31 AM CST -----  Regarding: advise  Contact: Chloe  Type: Needs Medical Advice  Who Called:  Chloe from [facility]   Symptoms (please be specific):    How long has patient had these symptoms:    Pharmacy name and phone #:    Best Call Back Number: [phone]   Additional Information: She is calling to have a consult with  about a consult for a mutual  pt. Please call back Thanks

## 2023-12-18 NOTE — TELEPHONE ENCOUNTER
S/w Chloe @ [facility] she wanted to know if Dr. Nelson or any of his NP went to [facility]. Notified her that they did not. Chloe verb understanding

## 2023-12-19 PROBLEM — M81.0 OSTEOPOROSIS: Status: RESOLVED | Noted: 2019-10-10 | Resolved: 2023-12-19

## 2023-12-20 PROBLEM — R94.31 PROLONGED Q-T INTERVAL ON ECG: Chronic | Status: ACTIVE | Noted: 2023-12-17

## 2023-12-20 PROBLEM — I67.89 CEREBRAL MICROVASCULAR DISEASE: Chronic | Status: ACTIVE | Noted: 2022-11-13

## 2023-12-20 PROBLEM — E43 SEVERE MALNUTRITION: Chronic | Status: ACTIVE | Noted: 2019-08-09

## 2023-12-20 PROBLEM — J44.9 COPD (CHRONIC OBSTRUCTIVE PULMONARY DISEASE): Chronic | Status: ACTIVE | Noted: 2022-11-10

## 2023-12-20 PROBLEM — I34.0 NONRHEUMATIC MITRAL VALVE REGURGITATION: Chronic | Status: ACTIVE | Noted: 2022-11-13

## 2023-12-20 PROBLEM — D32.9 MENINGIOMA: Chronic | Status: ACTIVE | Noted: 2022-11-11

## 2024-01-26 ENCOUNTER — TELEPHONE (OUTPATIENT)
Dept: ENDOCRINOLOGY | Facility: CLINIC | Age: 79
End: 2024-01-26
Payer: MEDICARE

## 2024-01-26 NOTE — TELEPHONE ENCOUNTER
S/w pt nad she wanted to know what was that 2/6 appt for.Notified her it was for weight check and a CMP lab. Pt states she will not be able to make it but [facility] will be coming to her house for a visit. Advised her to have them take her weight and see if they can draw the CMP.Pt verb understanding

## 2024-01-26 NOTE — TELEPHONE ENCOUNTER
----- Message from Nataly Guillermo sent at 1/26/2024 12:41 PM CST -----  Contact: pt  Type: Needs Medical Advice  Who Called:  pt  Best Call Back Number: [phone]    Additional Information: Pt is calling the office regarding injection she is suppose to be scheduled for.Please call back and advise.

## 2024-01-29 ENCOUNTER — TELEPHONE (OUTPATIENT)
Dept: ENDOCRINOLOGY | Facility: CLINIC | Age: 79
End: 2024-01-29
Payer: MEDICARE

## 2024-01-29 DIAGNOSIS — E87.6 HYPOKALEMIA: Primary | ICD-10-CM

## 2024-01-29 NOTE — TELEPHONE ENCOUNTER
----- Message from Chary Colón sent at 1/29/2024  2:12 PM CST -----  Contact: kathryn ladd/brando  Type: Needs Medical Advice  Who Called:  kathryn ladd/ brando  Symptoms (please be specific):  crital labs  Best Call Back Number: [phone] chemistry at [facility]  Additional Information: please call

## 2024-01-29 NOTE — TELEPHONE ENCOUNTER
Pt advised of Dr. Nelson's recommendation and verb understanding. States she can't come on Wed to repeat lab, pt is currently getting home health.  S/w [facility] Chary RODARTE, they will draw potassium level again on Wed, 1/31/24.  Order in Epic.

## 2024-01-30 ENCOUNTER — TELEPHONE (OUTPATIENT)
Dept: OTOLARYNGOLOGY | Facility: CLINIC | Age: 79
End: 2024-01-30
Payer: MEDICARE

## 2024-01-30 NOTE — TELEPHONE ENCOUNTER
----- Message from Sandee Wallace sent at 1/30/2024 12:03 PM CST -----  Type: Needs Medical Advice  Who Called:  patient  Best Call Back Number: [phone] (home)   Additional Information: patient requesting a call back- she has ulcers all in her mouth and has taken medication for a week and they haven't gone away

## 2024-01-31 DIAGNOSIS — E27.40 ADRENAL INSUFFICIENCY: ICD-10-CM

## 2024-01-31 RX ORDER — PREDNISONE 5 MG/1
5 TABLET ORAL DAILY
Qty: 30 TABLET | Refills: 6 | Status: SHIPPED | OUTPATIENT
Start: 2024-01-31

## 2024-02-02 ENCOUNTER — LAB VISIT (OUTPATIENT)
Dept: LAB | Facility: HOSPITAL | Age: 79
End: 2024-02-02
Payer: MEDICARE

## 2024-02-02 ENCOUNTER — OFFICE VISIT (OUTPATIENT)
Dept: OTOLARYNGOLOGY | Facility: CLINIC | Age: 79
End: 2024-02-02
Payer: MEDICARE

## 2024-02-02 DIAGNOSIS — E87.6 HYPOPOTASSEMIA: ICD-10-CM

## 2024-02-02 DIAGNOSIS — K12.0 APHTHOUS ULCER: Primary | ICD-10-CM

## 2024-02-02 DIAGNOSIS — E22.2 SYNDROME OF INAPPROPRIATE VASOPRESSIN SECRETION: ICD-10-CM

## 2024-02-02 DIAGNOSIS — E87.1 NA DEFICIENCY: ICD-10-CM

## 2024-02-02 DIAGNOSIS — K13.79 ORAL PAIN: ICD-10-CM

## 2024-02-02 LAB
POTASSIUM SERPL-SCNC: 5 MMOL/L (ref 3.5–5.1)
SODIUM SERPL-SCNC: 138 MMOL/L (ref 136–145)

## 2024-02-02 PROCEDURE — 99999 PR PBB SHADOW E&M-EST. PATIENT-LVL III: CPT | Mod: PBBFAC,,, | Performed by: STUDENT IN AN ORGANIZED HEALTH CARE EDUCATION/TRAINING PROGRAM

## 2024-02-02 PROCEDURE — 84295 ASSAY OF SERUM SODIUM: CPT | Performed by: INTERNAL MEDICINE

## 2024-02-02 PROCEDURE — 84132 ASSAY OF SERUM POTASSIUM: CPT | Performed by: INTERNAL MEDICINE

## 2024-02-02 PROCEDURE — 36415 COLL VENOUS BLD VENIPUNCTURE: CPT | Mod: PO | Performed by: INTERNAL MEDICINE

## 2024-02-02 PROCEDURE — 99214 OFFICE O/P EST MOD 30 MIN: CPT | Mod: S$GLB,,, | Performed by: STUDENT IN AN ORGANIZED HEALTH CARE EDUCATION/TRAINING PROGRAM

## 2024-02-02 PROCEDURE — 1159F MED LIST DOCD IN RCRD: CPT | Mod: CPTII,S$GLB,, | Performed by: STUDENT IN AN ORGANIZED HEALTH CARE EDUCATION/TRAINING PROGRAM

## 2024-02-02 RX ORDER — POTASSIUM CHLORIDE 600 MG/1
8 TABLET, FILM COATED, EXTENDED RELEASE ORAL 2 TIMES DAILY
COMMUNITY
Start: 2024-01-31 | End: 2024-03-01

## 2024-02-02 RX ORDER — SODIUM CHLORIDE 1 G/1
1 TABLET ORAL 3 TIMES DAILY
COMMUNITY
Start: 2024-01-31 | End: 2024-04-30

## 2024-02-02 NOTE — TELEPHONE ENCOUNTER
LVM advising pt.      S/w Chary ladd/ [facility], they will arrange to have BMP drawn Wed, 2/7/24.

## 2024-02-02 NOTE — PROGRESS NOTES
Otolaryngology Clinic Note    Subjective:       Patient ID: Telma Fraser is a 78 y.o. female.    Chief Complaint:mouth sores x 1 week      History of Present Illness: Telma Fraser is a 78 y.o. female presenting with excess salivation for past 3 months. Asked PCP for something for this, advised to see ENT. Around mealtime with drool all the time. Drools constantly. Keeps tissue on her all the time, drools at night and during day. No ulceration or irritation from this. No octavia med changes. She does take allegra, long list of meds. Moves her tongue around a lot to suck drool back in. Nose runs all day too. Has tried reflux medicine and no change. Atrovent does not help nose past 2 hours. Does not help with drool.     12/15/23: RTC. Has been on robinul. She thinks it helps for a few minutes, but not long. Maybe 30 minutes of benefit. Has had sore throat, worse in the morning. Feels raw and gets a tickle in her throat, causes dry cough. Has been past week or so. Is on reflux medicine- protonix BID. Robinul did not change sore throat. Nose not as runny as it was. Was using flovent. Uses albuterol. Drinks crystal light all day. Has fan in bedroom. No humidifier.     2/2/24: has had ulcers in her mouth past week. Kidney doc said gingivitis. Has been putting numbing med on it. Is painful. Is on bottom and top of gums. Never happened before. Does not feel too dry. High dose robinul is helping. Some constipation. Takes stool softener. No recent illnesses. Got abx wed from kidney doc- clindamycin. Started yesterday for the mouth.       Past Surgical History:   Procedure Laterality Date    analplasty      for hemorrhoids    APPENDECTOMY      BUNIONECTOMY Bilateral     CARDIAC ELECTROPHYSIOLOGY MAPPING AND ABLATION  03/14/2016    COLONOSCOPY N/A 01/09/2019    Procedure: COLONOSCOPY;  Surgeon: Don Medrano MD;  Location: [facility];  Service: Endoscopy;  Laterality: N/A;    COLONOSCOPY N/A 02/23/2022     Procedure: COLONOSCOPY;  Surgeon: Don Medrano MD;  Location: [facility] ENDO;  Service: Endoscopy;  Laterality: N/A;    ESOPHAGOGASTRODUODENOSCOPY N/A 02/23/2022    Procedure: EGD (ESOPHAGOGASTRODUODENOSCOPY);  Surgeon: Don Medrano MD;  Location: [facility] ENDO;  Service: Endoscopy;  Laterality: N/A;    GASTROPARESIS      JOINT REPLACEMENT Right     KNEE ARTHROPLASTY Right     right TKA    KNEE ARTHROSCOPY Left     LEFT SCOPE 6/7/04 10/14/11    left thumb      left wrist      right knee      right wrist      SINUS SURGERY      SMALL BOWEL ENTEROSCOPY N/A 12/21/2023    Procedure: ENTEROSCOPY;  Surgeon: Tony Aviles MD;  Location: [facility] ENDO;  Service: Endoscopy;  Laterality: N/A;    TUBAL LIGATION       Past Medical History:   Diagnosis Date    Achalasia of esophagus     Acquired hypogammaglobulinemia     Adrenal cortical hypofunction     Adrenal insufficiency 06/22/2016 6/22/16 Dr. Jean Nelson Currently Has Her On Prednisone 5 Mg Daily    Anemia due to chronic blood loss 12/13/2017    Anemia, chronic disease 12/13/2017    Anemia, chronic renal failure 12/13/2017    Anxiety     Asthma     Asthma without status asthmaticus     Atrial fibrillation     Chronic gastritis     mild    Closed fracture of left inferior pubic ramus 9/29/2018    COPD (chronic obstructive pulmonary disease)     Depression     Diplopia     Diseases of tricuspid valve     Dizziness 06/22/2016    Early satiety 06/22/2016    Fall 08/2019    Fall 12/08/2019    left wrist injury    Fall 11/2020    with injury    H/O: GI bleed 12/13/2017    History of colon polyps     Hyperlipemia     Hyperlipidemia     Hypertension     Hyposmolality and/or hyponatremia     Hypothyroidism     Insomnia, unspecified     Mitral valve disorders(424.0)     Nausea And Vomiting 06/22/2016    Nephrogenous proteinuria     Other abnormal glucose     Senile osteoporosis     SIADH (syndrome of inappropriate ADH production)     TIA (transient ischemic attack)      Vitamin D deficiency     Tor Parkinson White pattern seen on electrocardiogram      Social Determinants of Health     Tobacco Use: Medium Risk (1/29/2024)    Patient History     Smoking Tobacco Use: Former     Smokeless Tobacco Use: Never     Passive Exposure: Not on file   Alcohol Use: Not At Risk (11/28/2018)    AUDIT-C     Frequency of Alcohol Consumption: Never     Average Number of Drinks: Not on file     Frequency of Binge Drinking: Not on file   Financial Resource Strain: Not on file   Food Insecurity: No Food Insecurity (12/19/2023)    Hunger Vital Sign     Worried About Running Out of Food in the Last Year: Never true     Ran Out of Food in the Last Year: Never true   Transportation Needs: No Transportation Needs (12/28/2023)    OASIS : Transportation     Lack of Transportation (Medical): No     Lack of Transportation (Non-Medical): No     Patient Unable or Declines to Respond: No   Physical Activity: Not on file   Stress: No Stress Concern Present (6/9/2020)    [employer] - Occupational Stress Questionnaire     Feeling of Stress : Only a little   Social Connections: Feeling Socially Integrated (12/28/2023)    OASIS : Social Isolation     Frequency of experiencing loneliness or isolation: Never   Housing Stability: Low Risk  (12/19/2023)    Housing Stability Vital Sign     Unable to Pay for Housing in the Last Year: No     Number of Places Lived in the Last Year: 1     Unstable Housing in the Last Year: No   Depression: Low Risk  (2/28/2022)    Depression     Last PHQ-4: Flowsheet Data: 0     Review of patient's allergies indicates:   Allergen Reactions    Bactrim [sulfamethoxazole-trimethoprim] Other (See Comments)     Vomiting    Butalbital-aspirin-caffeine Hives and Other (See Comments)    Gabapentin Other (See Comments)     shakes    Pregabalin Other (See Comments), Rash and Swelling     Shortness of breath  Shortness of breath  Shortness of breath       Raloxifene Other (See Comments) and Shortness Of Breath     Shortness of breath    Sulfa (sulfonamide antibiotics) Anaphylaxis    Ceftin [cefuroxime axetil]      PT STATES PILL IS TOO HARD TO SWALLOW     Doxycycline Nausea And Vomiting and Other (See Comments)     Tore up her stomach    Rosuvastatin Other (See Comments)     Muscle pain  Muscle pain     Current Outpatient Medications   Medication Instructions    albuterol (PROVENTIL/VENTOLIN HFA) 90 mcg/actuation inhaler 2 puffs, Inhalation, Every 6 hours PRN    ALPRAZolam (XANAX) 0.5 mg, Oral, 3 times daily PRN    apixaban (ELIQUIS) 5 mg, Oral, 2 times daily    atorvastatin (LIPITOR) 40 mg, Oral, Nightly    biotin 1,000 mcg Chew 1 tablet, Oral, Daily    calcium carbonate (OS-MALIK) 500 mg calcium (1,250 mg) tablet 2 tablets, Oral, Daily    cetirizine (ZYRTEC) 10 mg, Oral, Nightly    cholecalciferol (vitamin D3) (VITAMIN D3) 1,000 Units, Oral, Daily    clindamycin (CLEOCIN) 300 mg, Oral    cyanocobalamin (VITAMIN B-12) 100 mcg, Oral, Daily    denosumab (PROLIA) 60 mg, Subcutaneous, Every 6 months    docusate sodium (COLACE) 100 mg, Oral, 2 times daily    estradioL (ESTRACE) 1 g, Vaginal, Daily    fentaNYL (DURAGESIC) 25 mcg/hr 1 patch, Transdermal, Every 72 hours    fludrocortisone (FLORINEF) 100 mcg, Oral, Daily    fluticasone propionate (FLOVENT HFA) 44 mcg/actuation inhaler 1 puff, Inhalation, 2 times daily, Controller    glycopyrrolate (ROBINUL) 2 mg, Oral, 3 times daily    IMMUN GLOB G,IGG,/PRO/IGA 0-50 (PRIVIGEN IV) 25 mg, Intravenous, Every 30 days    ipratropium (ATROVENT) 42 mcg (0.06 %) nasal spray 1-2 sprays, 3 times daily PRN    iron-vit c-vit b12-folic acid (IRON-C PLUS) tablet 1 tablet, Oral, Daily    lisinopriL (PRINIVIL,ZESTRIL) 2.5 mg, Oral, Daily    metoclopramide HCl (REGLAN) 10 mg, Oral, 3 times daily before meals    metOLazone (ZAROXOLYN) 5 mg, Oral, As needed (PRN), ordered per Dr gisell Navas *cadiologist) stated pt dtr [place]    metoprolol  succinate (TOPROL-XL) 12.5 mg, Oral, Daily    mupirocin (BACTROBAN) 2 % ointment Topical (Top), 2 times daily    pantoprazole (PROTONIX) 40 mg, Oral, 2 times daily    potassium chloride (KLOR-CON) 8 MEQ TbSR 8 mEq, Oral    predniSONE (DELTASONE) 5 mg, Oral, Daily    primidone (MYSOLINE) 250 mg, Oral, Nightly    propafenone (RYTHMOL) 225 mg, Oral, 2 times daily    sodium chloride 1 g, Oral    temazepam (RESTORIL) 30 mg, Oral, Nightly PRN    traMADoL (ULTRAM) 50 mg, Oral, 3 times daily    zonisamide (ZONEGRAN) 200 mg, Oral, Nightly         ENT ROS negative except as stated above.     Patient answers are not available for this visit.            Objective:      There were no vitals filed for this visit.    General: NAD, well appearing  Eyes: Normal conjunctiva and lids  Face: symmetric, nerve intact  Nose: The nose is without any evidence of any deformity. The nasal mucosa is moist with clear rhinorrhea. The septum is midline. There is no evidence of septal hematoma. The turbinates are without abnormality.   Ears: The ears are with normal-appearing pinna. Examination of the canals is normal appearing bilaterally. There is no drainage or erythema noted. The tympanic membranes are normal appearing with pearly color, normal-appearing landmarks and normal light reflex. Hearing is grossly intact.  Mouth: Raw at commissures. The teeth are poor with gingival exposure. Has ulcer at right lower and upper canine area only. The gingivae are without any obvious evidence of infection or lesion. The oral mucosa is moist and pink. There are no obvious masses to the hard or soft palate.   Oropharynx: The uvula is midline.  The tongue is midline with some cobblestoning changes. The posterior pharynx is without erythema or exudate. The tonsils are normal appearing.  Salivary glands: The salivary glands are symmetric and not enlarged, no masses  Neck: No lymphadenopathy, trachea midline, thryoid not enlarged.  Psych: Normal mood and  affect.   Neuro: Grossly intact  Speech: fluent       Assessment and Plan:       1. Aphthous ulcer    2. Oral pain            Doing well with robinul dose at TID. If not sufficient, can try botox glands.     Can cause hyperthermia and heat stroke in hot environments. Other observed adverse effects include dry mouth, difficulty urinating, headaches, diarrhea, and constipation. Monitor heart rate and maintain adequate hydration to avoid adverse events.    Looks like benign ulcers today. Will give her magic mouthwash with prednisone and nystatin to help today, TID 10 days. May need to back of robinul to increase saliva while healing.     RTC: 6 months    Plan of care was discussed in detail with the patient, who agreed with the plan as above. All questions were answered in detail.     Kirsty Raymond MD  Otolaryngology

## 2024-02-16 ENCOUNTER — TELEPHONE (OUTPATIENT)
Dept: ENDOCRINOLOGY | Facility: CLINIC | Age: 79
End: 2024-02-16
Payer: MEDICARE

## 2024-02-16 DIAGNOSIS — E22.2 SIADH (SYNDROME OF INAPPROPRIATE ADH PRODUCTION): ICD-10-CM

## 2024-02-16 DIAGNOSIS — E27.40 ADRENAL INSUFFICIENCY: Primary | ICD-10-CM

## 2024-02-16 RX ORDER — FLUDROCORTISONE ACETATE 0.1 MG/1
100 TABLET ORAL DAILY
Qty: 30 TABLET | Refills: 6 | Status: SHIPPED | OUTPATIENT
Start: 2024-02-16 | End: 2024-03-17

## 2024-02-16 NOTE — TELEPHONE ENCOUNTER
"S/w pt, she is taking 2 salt tabs TID (has been for "years"), she is also taking prednisone but has been off florinef since you had her stop it on 1/29/24.  She does not have any of the symptoms mentioned below.  Please advise.   "

## 2024-02-16 NOTE — TELEPHONE ENCOUNTER
Pt advised.  She still has florinef at home.  Will restart it today.  We've messaged the nurse with [facility] about repeating BMP on Monday.  Pt advised to go to ED immediately if she develops significant nausea, weakness or confusion and she verb understanding.

## 2024-02-16 NOTE — TELEPHONE ENCOUNTER
Restart fludrocortisone. Please see if she can pick it up today and take 1st dose today  Will send a message to [facility] to do BMP monday

## 2024-02-16 NOTE — TELEPHONE ENCOUNTER
Her sodium has decreased further  Can you see if taking predisone, fludrocortisone and salt tabs?    If develops significant nausea, confusion, weakness should go to ER immediately

## 2024-02-19 ENCOUNTER — TELEPHONE (OUTPATIENT)
Dept: ENDOCRINOLOGY | Facility: CLINIC | Age: 79
End: 2024-02-19
Payer: MEDICARE

## 2024-02-19 DIAGNOSIS — E27.40 ADRENAL INSUFFICIENCY: Primary | ICD-10-CM

## 2024-02-19 NOTE — TELEPHONE ENCOUNTER
Let her know Na starting to increase. Potassium normal    Check BMP 4 weeks. Can see if home health can do it

## 2024-02-19 NOTE — TELEPHONE ENCOUNTER
Pt advised and verb understanding.  States she mostly likely will not be receiving home health in 4 weeks.  Lab appt scheduled for [facility] lab.

## 2024-04-02 PROBLEM — E27.1 PRIMARY ADRENOCORTICAL INSUFFICIENCY: Status: ACTIVE | Noted: 2024-04-02

## 2024-05-03 ENCOUNTER — LAB VISIT (OUTPATIENT)
Dept: LAB | Facility: HOSPITAL | Age: 79
End: 2024-05-03
Attending: INTERNAL MEDICINE
Payer: MEDICARE

## 2024-05-03 DIAGNOSIS — M81.0 OSTEOPOROSIS, UNSPECIFIED OSTEOPOROSIS TYPE, UNSPECIFIED PATHOLOGICAL FRACTURE PRESENCE: ICD-10-CM

## 2024-05-03 DIAGNOSIS — E27.40 ADRENAL INSUFFICIENCY: ICD-10-CM

## 2024-05-03 DIAGNOSIS — E04.2 MULTINODULAR GOITER: ICD-10-CM

## 2024-05-03 LAB
ALBUMIN SERPL BCP-MCNC: 3.5 G/DL (ref 3.5–5.2)
ALBUMIN SERPL BCP-MCNC: 3.5 G/DL (ref 3.5–5.2)
ALP SERPL-CCNC: 42 U/L (ref 55–135)
ALT SERPL W/O P-5'-P-CCNC: 25 U/L (ref 10–44)
ANION GAP SERPL CALC-SCNC: 6 MMOL/L (ref 8–16)
AST SERPL-CCNC: 37 U/L (ref 10–40)
BILIRUB SERPL-MCNC: 0.4 MG/DL (ref 0.1–1)
BUN SERPL-MCNC: 14 MG/DL (ref 8–23)
CALCIUM SERPL-MCNC: 9.2 MG/DL (ref 8.7–10.5)
CHLORIDE SERPL-SCNC: 99 MMOL/L (ref 95–110)
CO2 SERPL-SCNC: 23 MMOL/L (ref 23–29)
CREAT SERPL-MCNC: 0.9 MG/DL (ref 0.5–1.4)
EST. GFR  (NO RACE VARIABLE): >60 ML/MIN/1.73 M^2
GLUCOSE SERPL-MCNC: 87 MG/DL (ref 70–110)
PHOSPHATE SERPL-MCNC: 3 MG/DL (ref 2.7–4.5)
POTASSIUM SERPL-SCNC: 4.2 MMOL/L (ref 3.5–5.1)
PROT SERPL-MCNC: 7.5 G/DL (ref 6–8.4)
SODIUM SERPL-SCNC: 128 MMOL/L (ref 136–145)

## 2024-05-03 PROCEDURE — 84100 ASSAY OF PHOSPHORUS: CPT | Performed by: INTERNAL MEDICINE

## 2024-05-03 PROCEDURE — 80053 COMPREHEN METABOLIC PANEL: CPT | Performed by: INTERNAL MEDICINE

## 2024-05-03 PROCEDURE — 36415 COLL VENOUS BLD VENIPUNCTURE: CPT | Mod: PO | Performed by: INTERNAL MEDICINE

## 2024-05-06 ENCOUNTER — OFFICE VISIT (OUTPATIENT)
Dept: ENDOCRINOLOGY | Facility: CLINIC | Age: 79
End: 2024-05-06
Payer: MEDICARE

## 2024-05-06 VITALS
HEART RATE: 61 BPM | OXYGEN SATURATION: 97 % | DIASTOLIC BLOOD PRESSURE: 60 MMHG | SYSTOLIC BLOOD PRESSURE: 100 MMHG | WEIGHT: 97.56 LBS | BODY MASS INDEX: 17.29 KG/M2 | HEIGHT: 63 IN

## 2024-05-06 DIAGNOSIS — M81.0 OSTEOPOROSIS, UNSPECIFIED OSTEOPOROSIS TYPE, UNSPECIFIED PATHOLOGICAL FRACTURE PRESENCE: ICD-10-CM

## 2024-05-06 DIAGNOSIS — E04.2 MULTINODULAR GOITER: ICD-10-CM

## 2024-05-06 DIAGNOSIS — E22.2 SIADH (SYNDROME OF INAPPROPRIATE ADH PRODUCTION): ICD-10-CM

## 2024-05-06 DIAGNOSIS — E27.40 ADRENAL INSUFFICIENCY: Primary | ICD-10-CM

## 2024-05-06 PROCEDURE — 3288F FALL RISK ASSESSMENT DOCD: CPT | Mod: CPTII,S$GLB,, | Performed by: INTERNAL MEDICINE

## 2024-05-06 PROCEDURE — 3074F SYST BP LT 130 MM HG: CPT | Mod: CPTII,S$GLB,, | Performed by: INTERNAL MEDICINE

## 2024-05-06 PROCEDURE — 1100F PTFALLS ASSESS-DOCD GE2>/YR: CPT | Mod: CPTII,S$GLB,, | Performed by: INTERNAL MEDICINE

## 2024-05-06 PROCEDURE — 99999 PR PBB SHADOW E&M-EST. PATIENT-LVL V: CPT | Mod: PBBFAC,,, | Performed by: INTERNAL MEDICINE

## 2024-05-06 PROCEDURE — 99214 OFFICE O/P EST MOD 30 MIN: CPT | Mod: S$GLB,,, | Performed by: INTERNAL MEDICINE

## 2024-05-06 PROCEDURE — 1159F MED LIST DOCD IN RCRD: CPT | Mod: CPTII,S$GLB,, | Performed by: INTERNAL MEDICINE

## 2024-05-06 PROCEDURE — 1160F RVW MEDS BY RX/DR IN RCRD: CPT | Mod: CPTII,S$GLB,, | Performed by: INTERNAL MEDICINE

## 2024-05-06 PROCEDURE — 1125F AMNT PAIN NOTED PAIN PRSNT: CPT | Mod: CPTII,S$GLB,, | Performed by: INTERNAL MEDICINE

## 2024-05-06 PROCEDURE — 3078F DIAST BP <80 MM HG: CPT | Mod: CPTII,S$GLB,, | Performed by: INTERNAL MEDICINE

## 2024-05-06 NOTE — PROGRESS NOTES
CHIEF COMPLAINT: Adrenal Insufficiency/ Osteoporosis/ SIADH/ Multinodular Goiter.   78 y.o.   being seen as a f/u. She had a benign FNA 3/21/13. 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 She is seeing nephrology. Started on a trial of hydrocortisone for possible adrenal insufficiency (could not R/O based on cosyntropin stim test and having fatigue). On prednisone 5 mg currently. Had a fracture of the pelvis Sept 2018 after a fall when walking to the bathroom.       Prolia scheduled 5/13/24. Tolerating Prolia. Taking Ca + D. No new falls. No new fractures. Not exercising. Wearing medic alert bracelet. Has not required sick day precautions. She did have a fall. Using a walker. Saw wound care due to cut in right shin. NO XRT to head/neck. No difficulty swallowing. No N/V                PAST MEDICAL HISTORY: Asthma, WPW, depression/anxiety, hyperlipidemia.      PAST SURGICAL HISTORY: right knee replacement. Hand surgery.      SOCIAL HISTORY: No T/A      FAMILY HISTORY: No known thyroid disease or thyroid cancer. Dementia.      MEDICATIONS/ALLERGIES: The patient's MedCard has been updated and reviewed.         PE:   GENERAL: Well developed, well nourished.   NECK: Supple, trachea midline, Left nodule palpable. No LAD   CHEST: Resp even and unlabored, CTA bilateral.   CARDIAC: RRR, S1, S2 heard, no murmurs, rubs, S3, or S4          Latest Reference Range & Units 05/03/24 12:52   Sodium 136 - 145 mmol/L  136 - 145 mmol/L  136 - 145 mmol/L 128 (L)  128 (L)  128 (L)   Potassium 3.5 - 5.1 mmol/L  3.5 - 5.1 mmol/L  3.5 - 5.1 mmol/L 4.2  4.2  4.2   Chloride 95 - 110 mmol/L  95 - 110 mmol/L  95 - 110 mmol/L 99  99  99   CO2 23 - 29 mmol/L  23 - 29 mmol/L  23 - 29 mmol/L 23  23  23   Anion Gap 8 - 16 mmol/L  8 - 16 mmol/L  8 - 16 mmol/L 6 (L)  6 (L)  6 (L)   BUN 8 - 23 mg/dL  8 - 23 mg/dL  8 - 23 mg/dL 14  14  14   Creatinine 0.5 - 1.4 mg/dL  0.5 - 1.4 mg/dL  0.5 - 1.4 mg/dL 0.9  0.9  0.9   eGFR >60 mL/min/1.73 m^2  >60  mL/min/1.73 m^2  >60 mL/min/1.73 m^2 >60.0  >60.0  >60.0   Glucose 70 - 110 mg/dL  70 - 110 mg/dL  70 - 110 mg/dL 87  87  87   Calcium 8.7 - 10.5 mg/dL  8.7 - 10.5 mg/dL  8.7 - 10.5 mg/dL 9.2  9.2  9.2   Phosphorus Level 2.7 - 4.5 mg/dL 3.0   ALP 55 - 135 U/L 42 (L)   PROTEIN TOTAL 6.0 - 8.4 g/dL 7.5   Albumin 3.5 - 5.2 g/dL  3.5 - 5.2 g/dL 3.5  3.5   BILIRUBIN TOTAL 0.1 - 1.0 mg/dL 0.4   AST 10 - 40 U/L 37   ALT 10 - 44 U/L 25   (L): Data is abnormally low      ASSESSMENT/PLAN:   1. Adrenal Insuffiency- Labs borderline for AI. Since having some symptoms that could be AI related decided to do a trial of steroids. Repeat testing could not rule out AI and symptoms improved with steroids, so continued steroids. Initially on Hydrocortisone, but could not tolerate. On prednisone and tolerating well. Discussed sick day precautions.  She is wearing medical alert bracelet.  Weight has been fluctuating.  Relatively stable.  No sick day dosing required an interim.    2. Multinodular goiter-S/P benign FNA of left nodule. US shows no significant changes from before. Denies obstructive symptoms. Can start doing Ultrasound Q 2 years.  She will be due for ultrasound in November of 2024.     3. SIADH- . Being followed by nephrology.  On tolvaptan.  Sodium decreased from when she saw Nephrology a few weeks ago.  Repeat BMP.  If still low will have her follow up with Nephrology.     4. Osteoporosis- has had a pelvis fracture. On Prolia. Taking Ca + D. Discussed fall precautions.  Discussed importance of weight-bearing exercise.  Therapy plan placed.         FOLLOWUP  BMP when comes in for privigen  F/U 6 months with Renal Panel prior to next prolia.

## 2024-05-13 ENCOUNTER — INFUSION (OUTPATIENT)
Dept: INFUSION THERAPY | Facility: HOSPITAL | Age: 79
End: 2024-05-13
Attending: INTERNAL MEDICINE
Payer: MEDICARE

## 2024-05-13 VITALS
TEMPERATURE: 98 F | SYSTOLIC BLOOD PRESSURE: 104 MMHG | RESPIRATION RATE: 16 BRPM | HEART RATE: 74 BPM | DIASTOLIC BLOOD PRESSURE: 62 MMHG

## 2024-05-13 DIAGNOSIS — M81.8 OTHER OSTEOPOROSIS WITHOUT CURRENT PATHOLOGICAL FRACTURE: Primary | ICD-10-CM

## 2024-05-13 PROCEDURE — 63600175 PHARM REV CODE 636 W HCPCS: Mod: JZ,JG,PN | Performed by: INTERNAL MEDICINE

## 2024-05-13 PROCEDURE — 96372 THER/PROPH/DIAG INJ SC/IM: CPT | Mod: PN

## 2024-05-13 RX ORDER — ACETAMINOPHEN 500 MG
1000 TABLET ORAL
Status: CANCELLED | OUTPATIENT
Start: 2024-05-27 | End: 2024-05-27

## 2024-05-13 RX ORDER — DIPHENHYDRAMINE HCL 25 MG
25 CAPSULE ORAL
Status: CANCELLED | OUTPATIENT
Start: 2024-05-27 | End: 2024-05-27

## 2024-05-13 RX ADMIN — DENOSUMAB 60 MG: 60 INJECTION SUBCUTANEOUS at 10:05

## 2024-06-24 ENCOUNTER — OFFICE VISIT (OUTPATIENT)
Dept: OTOLARYNGOLOGY | Facility: CLINIC | Age: 79
End: 2024-06-24
Payer: MEDICARE

## 2024-06-24 VITALS — BODY MASS INDEX: 17.19 KG/M2 | HEIGHT: 63 IN | WEIGHT: 97 LBS

## 2024-06-24 DIAGNOSIS — S01.301A OPEN WOUND OF RIGHT EAR, UNSPECIFIED OPEN WOUND TYPE, INITIAL ENCOUNTER: Primary | ICD-10-CM

## 2024-06-24 DIAGNOSIS — H61.23 BILATERAL IMPACTED CERUMEN: ICD-10-CM

## 2024-06-24 PROCEDURE — 99999 PR PBB SHADOW E&M-EST. PATIENT-LVL IV: CPT | Mod: PBBFAC,,, | Performed by: NURSE PRACTITIONER

## 2024-06-24 PROCEDURE — 1159F MED LIST DOCD IN RCRD: CPT | Mod: CPTII,S$GLB,, | Performed by: NURSE PRACTITIONER

## 2024-06-24 PROCEDURE — 1160F RVW MEDS BY RX/DR IN RCRD: CPT | Mod: CPTII,S$GLB,, | Performed by: NURSE PRACTITIONER

## 2024-06-24 PROCEDURE — 99213 OFFICE O/P EST LOW 20 MIN: CPT | Mod: 25,S$GLB,, | Performed by: NURSE PRACTITIONER

## 2024-06-24 PROCEDURE — 69210 REMOVE IMPACTED EAR WAX UNI: CPT | Mod: S$GLB,,, | Performed by: NURSE PRACTITIONER

## 2024-06-24 PROCEDURE — 3288F FALL RISK ASSESSMENT DOCD: CPT | Mod: CPTII,S$GLB,, | Performed by: NURSE PRACTITIONER

## 2024-06-24 PROCEDURE — 1126F AMNT PAIN NOTED NONE PRSNT: CPT | Mod: CPTII,S$GLB,, | Performed by: NURSE PRACTITIONER

## 2024-06-24 PROCEDURE — 1100F PTFALLS ASSESS-DOCD GE2>/YR: CPT | Mod: CPTII,S$GLB,, | Performed by: NURSE PRACTITIONER

## 2024-06-24 RX ORDER — FLUDROCORTISONE ACETATE 0.1 MG/1
100 TABLET ORAL
COMMUNITY
Start: 2024-06-20

## 2024-06-24 RX ORDER — IBUPROFEN 200 MG
1 TABLET ORAL 2 TIMES DAILY
Qty: 14 EACH | Refills: 0 | Status: SHIPPED | OUTPATIENT
Start: 2024-06-24 | End: 2024-07-01

## 2024-06-24 RX ORDER — CIPROFLOXACIN HYDROCHLORIDE 3 MG/ML
SOLUTION/ DROPS OPHTHALMIC
Qty: 10 ML | Refills: 0 | Status: SHIPPED | OUTPATIENT
Start: 2024-06-24

## 2024-06-24 NOTE — PROGRESS NOTES
Subjective     Patient ID: Telma Fraser is a 78 y.o. female.    Chief Complaint: Ear Fullness and Hearing Loss    Ear Fullness   Associated symptoms include hearing loss.     Patient states she cleans her ears with stan pins. Patient states she woke up Tuesday morning with right ear fullness. Denies otalgia, otorrhea.     Review of Systems   Constitutional: Negative.    HENT:  Positive for hearing loss.    Eyes: Negative.    Respiratory: Negative.     Cardiovascular: Negative.    Gastrointestinal: Negative.    Musculoskeletal: Negative.    Integumentary:  Negative.   Neurological: Negative.    Hematological: Negative.    Psychiatric/Behavioral: Negative.          Objective     Physical Exam  Vitals and nursing note reviewed.   Constitutional:       General: She is not in acute distress.     Appearance: She is well-developed. She is not ill-appearing.   HENT:      Head: Normocephalic and atraumatic.      Right Ear: Hearing, tympanic membrane, ear canal and external ear normal. No middle ear effusion. Tympanic membrane is not erythematous.      Left Ear: Hearing, tympanic membrane, ear canal and external ear normal.  No middle ear effusion. Tympanic membrane is not erythematous.      Nose: Nose normal.   Eyes:      General: Lids are normal. No scleral icterus.        Right eye: No discharge.         Left eye: No discharge.   Neck:      Trachea: Trachea normal. No tracheal deviation.   Cardiovascular:      Rate and Rhythm: Normal rate.   Pulmonary:      Effort: Pulmonary effort is normal. No respiratory distress.      Breath sounds: No stridor. No wheezing.   Musculoskeletal:         General: Normal range of motion.      Cervical back: Normal range of motion and neck supple.   Skin:     General: Skin is warm and dry.   Neurological:      Mental Status: She is alert and oriented to person, place, and time.      Coordination: Coordination normal.      Gait: Gait normal.   Psychiatric:         Attention and  Perception: Attention normal.         Mood and Affect: Mood normal.         Speech: Speech normal.         Behavior: Behavior normal. Behavior is cooperative.     SEPARATE PROCEDURE IN OFFICE:   Procedure: Removal of impacted cerumen, Bilateral  Pre Procedure Diagnosis: Cerumen Impaction   Post Procedure Diagnosis: Cerumen Impaction   Verbal informed consent in regards to risk of trauma to ear canal, ear drum or hearing, discomfort during procedure and/or inability to remove cerumen impaction in one session or unforeseen events or complications.   No anesthesia.     Procedure in detail:   Ear canal visualized bilateral with appropriate size ear speculum utilizing Operating Head Binocular Otomicroscope   Utilizing the following:  Ring curet, delicate alligator forceps, and/or suction cannula was used. The impacted cerumen of the ear canals was removed atraumatically. The TM and EAC were then inspected and found to be clear of wax. See description of TMs/EACs in PE above.   Complications: No   Condition: Improved/Good       Assessment and Plan     1. Open wound of right ear, unspecified open wound type, initial encounter  -     ciprofloxacin HCl (CILOXAN) 0.3 % ophthalmic solution; 4-5 drops in the right EAR twice daily for 7 days  Dispense: 10 mL; Refill: 0  -     applicators, cotton balls, etc (COTTON BALLS) Misc; 1 Units by Misc.(Non-Drug; Combo Route) route 2 (two) times a day. for 7 days  Dispense: 14 each; Refill: 0    2. Bilateral impacted cerumen      Cipro gtts AD BID X one week. Avoid inserting objects into ears, especially metal objects.   Patient encouraged to return to clinic if symptoms worsen/persist and as needed for further ENT symptoms or concerns.          No follow-ups on file.

## 2024-07-12 PROBLEM — I65.29 STENOSIS OF CAROTID ARTERY: Chronic | Status: ACTIVE | Noted: 2020-08-11

## 2024-07-19 DIAGNOSIS — E27.40 ADRENAL INSUFFICIENCY: ICD-10-CM

## 2024-07-20 RX ORDER — PREDNISONE 5 MG/1
5 TABLET ORAL
Qty: 90 TABLET | Refills: 3 | Status: SHIPPED | OUTPATIENT
Start: 2024-07-20

## 2024-07-24 ENCOUNTER — OFFICE VISIT (OUTPATIENT)
Dept: OTOLARYNGOLOGY | Facility: CLINIC | Age: 79
End: 2024-07-24
Payer: MEDICARE

## 2024-07-24 VITALS — BODY MASS INDEX: 17.27 KG/M2 | HEIGHT: 63 IN | WEIGHT: 97.44 LBS

## 2024-07-24 DIAGNOSIS — K11.7 EXCESSIVE SALIVATION: Primary | ICD-10-CM

## 2024-07-24 DIAGNOSIS — R68.2 DRY MOUTH: ICD-10-CM

## 2024-07-24 PROCEDURE — 99214 OFFICE O/P EST MOD 30 MIN: CPT | Mod: S$GLB,,, | Performed by: STUDENT IN AN ORGANIZED HEALTH CARE EDUCATION/TRAINING PROGRAM

## 2024-07-24 PROCEDURE — 99999 PR PBB SHADOW E&M-EST. PATIENT-LVL IV: CPT | Mod: PBBFAC,,, | Performed by: STUDENT IN AN ORGANIZED HEALTH CARE EDUCATION/TRAINING PROGRAM

## 2024-07-24 PROCEDURE — 1159F MED LIST DOCD IN RCRD: CPT | Mod: CPTII,S$GLB,, | Performed by: STUDENT IN AN ORGANIZED HEALTH CARE EDUCATION/TRAINING PROGRAM

## 2024-07-24 PROCEDURE — 1101F PT FALLS ASSESS-DOCD LE1/YR: CPT | Mod: CPTII,S$GLB,, | Performed by: STUDENT IN AN ORGANIZED HEALTH CARE EDUCATION/TRAINING PROGRAM

## 2024-07-24 PROCEDURE — 3288F FALL RISK ASSESSMENT DOCD: CPT | Mod: CPTII,S$GLB,, | Performed by: STUDENT IN AN ORGANIZED HEALTH CARE EDUCATION/TRAINING PROGRAM

## 2024-07-24 PROCEDURE — 1126F AMNT PAIN NOTED NONE PRSNT: CPT | Mod: CPTII,S$GLB,, | Performed by: STUDENT IN AN ORGANIZED HEALTH CARE EDUCATION/TRAINING PROGRAM

## 2024-07-24 RX ORDER — GLYCOPYRROLATE 1 MG/1
1 TABLET ORAL 3 TIMES DAILY
Qty: 90 TABLET | Refills: 2 | Status: SHIPPED | OUTPATIENT
Start: 2024-07-24 | End: 2024-10-22

## 2024-07-24 NOTE — PROGRESS NOTES
Otolaryngology Clinic Note    Subjective:       Patient ID: Telma Fraser is a 78 y.o. female.    Chief Complaint:mouth sores x 1 week      History of Present Illness: Telma Fraser is a 78 y.o. female presenting with excess salivation for past 3 months. Asked PCP for something for this, advised to see ENT. Around mealtime with drool all the time. Drools constantly. Keeps tissue on her all the time, drools at night and during day. No ulceration or irritation from this. No octavia med changes. She does take allegra, long list of meds. Moves her tongue around a lot to suck drool back in. Nose runs all day too. Has tried reflux medicine and no change. Atrovent does not help nose past 2 hours. Does not help with drool.     12/15/23: RTC. Has been on robinul. She thinks it helps for a few minutes, but not long. Maybe 30 minutes of benefit. Has had sore throat, worse in the morning. Feels raw and gets a tickle in her throat, causes dry cough. Has been past week or so. Is on reflux medicine- protonix BID. Robinul did not change sore throat. Nose not as runny as it was. Was using flovent. Uses albuterol. Drinks crystal light all day. Has fan in bedroom. No humidifier.     2/2/24: has had ulcers in her mouth past week. Kidney doc said gingivitis. Has been putting numbing med on it. Is painful. Is on bottom and top of gums. Never happened before. Does not feel too dry. High dose robinul is helping. Some constipation. Takes stool softener. No recent illnesses. Got abx wed from kidney doc- clindamycin. Started yesterday for the mouth.     7/24/24: feels like pill makes her mouth too slimy or too dry. Has not taken this morning and still slimy. Not taking TID every day. She is drinking more water and urinating a lot. Affects eating due to slimy. Last took robinul last night. She takes narcotic, baseline constipation issues.       Past Surgical History:   Procedure Laterality Date    analplasty      for hemorrhoids     APPENDECTOMY      BUNIONECTOMY Bilateral     CARDIAC ELECTROPHYSIOLOGY MAPPING AND ABLATION  03/14/2016    COLONOSCOPY N/A 01/09/2019    Procedure: COLONOSCOPY;  Surgeon: Don Medrano MD;  [facility];  Service: Endoscopy;  Laterality: N/A;    COLONOSCOPY N/A 02/23/2022    Procedure: COLONOSCOPY;  Surgeon: Don Medrano MD;  [facility];  Service: Endoscopy;  Laterality: N/A;    ESOPHAGEAL DILATION N/A 4/15/2024    Procedure: DILATION, ESOPHAGUS;  Surgeon: Don Medrano MD;  Location: [facility];  Service: Endoscopy;  Laterality: N/A;    ESOPHAGOGASTRODUODENOSCOPY N/A 02/23/2022    Procedure: EGD (ESOPHAGOGASTRODUODENOSCOPY);  Surgeon: Don Medrano MD;  Location: [facility];  Service: Endoscopy;  Laterality: N/A;    ESOPHAGOGASTRODUODENOSCOPY N/A 4/15/2024    Procedure: EGD (ESOPHAGOGASTRODUODENOSCOPY);  Surgeon: Don Medrano MD;  Location: [facility];  Service: Endoscopy;  Laterality: N/A;    GASTROPARESIS      JOINT REPLACEMENT Right     KNEE ARTHROPLASTY Right     right TKA    KNEE ARTHROSCOPY Left     LEFT SCOPE 6/7/04 10/14/11    left thumb      left wrist      right knee      right wrist      SINUS SURGERY      SMALL BOWEL ENTEROSCOPY N/A 12/21/2023    Procedure: ENTEROSCOPY;  Surgeon: Tony Aviles MD;  Location: [facility];  Service: Endoscopy;  Laterality: N/A;    TUBAL LIGATION       Past Medical History:   Diagnosis Date    Achalasia of esophagus     Acquired hypogammaglobulinemia     Adrenal cortical hypofunction     Adrenal insufficiency 06/22/2016 6/22/16 Dr. Jean Nelson Currently Has Her On Prednisone 5 Mg Daily    Anemia due to chronic blood loss 12/13/2017    Anemia, chronic disease 12/13/2017    Anemia, chronic renal failure 12/13/2017    Anxiety     Asthma     Asthma without status asthmaticus     Atrial fibrillation     Chronic gastritis     mild    Closed fracture of left inferior pubic ramus 9/29/2018    COPD (chronic obstructive  pulmonary disease)     Depression     Diplopia     Diseases of tricuspid valve     Dizziness 06/22/2016    Early satiety 06/22/2016    Fall 08/2019    Fall 12/08/2019    left wrist injury    Fall 11/2020    with injury    H/O: GI bleed 12/13/2017    History of colon polyps     Hyperlipemia     Hyperlipidemia     Hypertension     Hyposmolality and/or hyponatremia     Hypothyroidism     Insomnia, unspecified     Mitral valve disorders(424.0)     Nausea And Vomiting 06/22/2016    Nephrogenous proteinuria     Other abnormal glucose     Senile osteoporosis     SIADH (syndrome of inappropriate ADH production)     TIA (transient ischemic attack)     Vitamin D deficiency     Tor Parkinson White pattern seen on electrocardiogram      Social Determinants of Health     Tobacco Use: Medium Risk (7/24/2024)    Patient History     Smoking Tobacco Use: Former     Smokeless Tobacco Use: Never     Passive Exposure: Not on file   Alcohol Use: Not At Risk (11/28/2018)    AUDIT-C     Frequency of Alcohol Consumption: Never     Average Number of Drinks: Not on file     Frequency of Binge Drinking: Not on file   Financial Resource Strain: Not on file   Food Insecurity: No Food Insecurity (12/19/2023)    Hunger Vital Sign     Worried About Running Out of Food in the Last Year: Never true     Ran Out of Food in the Last Year: Never true   Transportation Needs: No Transportation Needs (6/8/2024)    OASIS : Transportation     Lack of Transportation (Medical): No     Lack of Transportation (Non-Medical): No     Patient Unable or Declines to Respond: No   Physical Activity: Not on file   Stress: No Stress Concern Present (6/9/2020)    [employer] - Occupational Stress Questionnaire     Feeling of Stress : Only a little   Housing Stability: Low Risk  (12/19/2023)    Housing Stability Vital Sign     Unable to Pay for Housing in the Last Year: No     Number of Places Lived in the Last Year: 1     Unstable  Housing in the Last Year: No   Depression: Low Risk  (4/2/2024)    Depression     Last PHQ-4: Flowsheet Data: 0   Utilities: Not on file   Health Literacy: Adequate Health Literacy (6/8/2024)    OASIS : Health Literacy     Frequency of needing help to read materials from doctor or pharmacy: Never   Social Isolation: Feeling Socially Integrated (4/18/2024)    OASIS : Social Isolation     Frequency of experiencing loneliness or isolation: Never     Review of patient's allergies indicates:   Allergen Reactions    Bactrim [sulfamethoxazole-trimethoprim] Other (See Comments)     Vomiting    Butalbital-aspirin-caffeine Hives and Other (See Comments)    Gabapentin Other (See Comments)     shakes    Pregabalin Other (See Comments), Rash and Swelling     Shortness of breath  Shortness of breath  Shortness of breath      Raloxifene Other (See Comments) and Shortness Of Breath     Shortness of breath    Sulfa (sulfonamide antibiotics) Anaphylaxis    Ceftin [cefuroxime axetil]      PT STATES PILL IS TOO HARD TO SWALLOW     Doxycycline Nausea And Vomiting and Other (See Comments)     Tore up her stomach    Rosuvastatin Other (See Comments)     Muscle pain  Muscle pain     Current Outpatient Medications   Medication Instructions    albuterol (PROVENTIL/VENTOLIN HFA) 90 mcg/actuation inhaler 2 puffs, Inhalation, Every 6 hours PRN    ALPRAZolam (XANAX) 0.5 MG tablet TAKE 1 TABLET TWICE A DAY BY ORAL ROUTE AS NEEDED FOR 30 DAYS.    apixaban (ELIQUIS) 2.5 mg, Oral, 2 times daily    atorvastatin (LIPITOR) 40 mg, Oral, Nightly    BENEFIBER, WHEAT DEXTRIN, ORAL 1 Dose, Daily PRN    biotin 1,000 mcg Chew 1 tablet, Oral, Daily    calcium carbonate (OS-MALIK) 500 mg calcium (1,250 mg) tablet 2 tablets, Oral, Daily    cephALEXin (KEFLEX) 250 mg, Oral, Daily    cetirizine (ZYRTEC) 10 mg, Oral, Nightly    cholecalciferol (vitamin D3) (VITAMIN D3) 1,000 Units, Oral, Daily    cyanocobalamin (VITAMIN B-12) 100 mcg, Oral, Daily     "denosumab (PROLIA) 60 mg, Subcutaneous, Every 6 months    dextromethorphan HBr 10 mg/5 mL Liqd 20 mLs, Oral, 4 times daily PRN    docusate sodium (COLACE) 100 mg, Oral, 2 times daily, taking 2 twice daily    estradioL (ESTRACE) 1 g, Vaginal, Daily    famotidine (PEPCID) 20 mg, Oral, 2 times daily    fentaNYL (DURAGESIC) 25 mcg/hr 1 patch, Transdermal, Every 72 hours    fludrocortisone (FLORINEF) 100 mcg, Oral    glycopyrrolate (ROBINUL) 1 mg, Oral, 3 times daily    ibuprofen (ADVIL LIQUI-GELS MINIS) 200 mg Cap 2 capsules, Oral, 2 times daily PRN    IMMUN GLOB G,IGG,/PRO/IGA 0-50 (PRIVIGEN IV) 25 mg, Intravenous, Every 30 days    iron-vit c-vit b12-folic acid (IRON-C PLUS) tablet 1 tablet, Oral, Daily    levocetirizine (XYZAL) 5 mg, Oral    lisinopriL (PRINIVIL,ZESTRIL) 2.5 mg, Oral, Daily    melatonin 12 mg, Oral, Nightly, pt stated "I take this every time I wake up during the night" Instructed pt only 1 x per night    metoclopramide HCl (REGLAN) 10 mg, Oral, 3 times daily before meals, taking only as needed    metoprolol succinate (TOPROL-XL) 12.5 mg, Oral, Daily    nystatin (MYCOSTATIN) 600,000 Units, Oral, 3 times daily, Swish and swallow.    pantoprazole (PROTONIX) 40 mg, Oral, 2 times daily    potassium chloride (MICRO-K) 8 mEq CpSR 10 mEq, Oral, 2 times daily    predniSONE (DELTASONE) 5 mg, Oral    primidone (MYSOLINE) 250 mg, Oral, Nightly    propafenone (RYTHMOL) 225 mg, Oral, 2 times daily    temazepam (RESTORIL) 30 mg, Oral, Nightly    temazepam (RESTORIL) 30 mg, Oral, Nightly    tolvaptan (SAMSCA) 15 mg, Oral, Daily, take 1 tablet every day by mouth    traMADoL (ULTRAM) 50 mg, Oral, 3 times daily    valACYclovir (VALTREX) 1000 MG tablet Take 2 by mouth at the onset of fever blisters and then repeat the dosage in 12 hours    zonisamide (ZONEGRAN) 200 mg, Oral, Nightly         ENT ROS negative except as stated above.     Patient answers are not available for this visit.            Objective:      There were " no vitals filed for this visit.    General: NAD, well appearing  Eyes: Normal conjunctiva and lids  Face: symmetric, nerve intact  Nose: The nose is without any evidence of any deformity. The nasal mucosa is moist with clear rhinorrhea. The septum is midline. There is no evidence of septal hematoma. The turbinates are without abnormality.   Ears: The ears are with normal-appearing pinna. Examination of the canals is normal appearing bilaterally. There is no drainage or erythema noted. The tympanic membranes are normal appearing with pearly color, normal-appearing landmarks and normal light reflex. Hearing is grossly intact.  Mouth: Raw at commissures. The teeth are poor with gingival exposure. Has ulcer at right lower and upper canine area only. The gingivae are without any obvious evidence of infection or lesion. The oral mucosa is moist and pink. There are no obvious masses to the hard or soft palate.   Oropharynx: The uvula is midline.  The tongue is midline with some cobblestoning changes. The posterior pharynx is without erythema or exudate. The tonsils are normal appearing. Mouth is dry, uvula stuck to palate.   Salivary glands: The salivary glands are symmetric and not enlarged, no masses  Neck: No lymphadenopathy, trachea midline, thryoid not enlarged.  Psych: Normal mood and affect.   Neuro: Grossly intact  Speech: fluent       Assessment and Plan:       1. Excessive salivation    2. Dry mouth              Too dry with 2 mg robinul dose taking BID, rarely TID. Did not do well enough with 1 mg dose TID. Will see if we can get botox glands approved. Would do in clinic with ultrasound, probably just submandibular glands to start.   Will send 1 mg dose robinul while we see about PA for botox.     Can cause hyperthermia and heat stroke in hot environments. Other observed adverse effects include dry mouth, difficulty urinating, headaches, diarrhea, and constipation. Monitor heart rate and maintain adequate  hydration to avoid adverse events.      RTC: will see is botox salivary glands is reasonable and bring back for 40 min procedure.     Plan of care was discussed in detail with the patient, who agreed with the plan as above. All questions were answered in detail.     Kirsty Raymond MD  Otolaryngology

## 2024-08-12 ENCOUNTER — OFFICE VISIT (OUTPATIENT)
Dept: ORTHOPEDICS | Facility: CLINIC | Age: 79
End: 2024-08-12
Payer: MEDICARE

## 2024-08-12 ENCOUNTER — HOSPITAL ENCOUNTER (OUTPATIENT)
Dept: RADIOLOGY | Facility: HOSPITAL | Age: 79
Discharge: HOME | End: 2024-08-12
Attending: ORTHOPAEDIC SURGERY
Payer: MEDICARE

## 2024-08-12 DIAGNOSIS — M18.12 ARTHRITIS OF CARPOMETACARPAL (CMC) JOINT OF LEFT THUMB: ICD-10-CM

## 2024-08-12 DIAGNOSIS — M79.642 PAIN IN LEFT HAND: ICD-10-CM

## 2024-08-12 DIAGNOSIS — M25.531 PAIN IN RIGHT WRIST: Primary | ICD-10-CM

## 2024-08-12 DIAGNOSIS — M25.531 PAIN IN RIGHT WRIST: ICD-10-CM

## 2024-08-12 DIAGNOSIS — M65.30 TRIGGER FINGER, UNSPECIFIED FINGER, UNSPECIFIED LATERALITY: ICD-10-CM

## 2024-08-12 DIAGNOSIS — S63.659A SAGITTAL BAND RUPTURE AT METACARPOPHALANGEAL JOINT, INITIAL ENCOUNTER: ICD-10-CM

## 2024-08-12 PROCEDURE — 1159F MED LIST DOCD IN RCRD: CPT | Mod: CPTII,S$GLB,, | Performed by: ORTHOPAEDIC SURGERY

## 2024-08-12 PROCEDURE — 99999 PR PBB SHADOW E&M-EST. PATIENT-LVL IV: CPT | Mod: PBBFAC,,, | Performed by: ORTHOPAEDIC SURGERY

## 2024-08-12 PROCEDURE — 99204 OFFICE O/P NEW MOD 45 MIN: CPT | Mod: 25,S$GLB,, | Performed by: ORTHOPAEDIC SURGERY

## 2024-08-12 PROCEDURE — 73110 X-RAY EXAM OF WRIST: CPT | Mod: TC,PO,RT

## 2024-08-12 PROCEDURE — 73110 X-RAY EXAM OF WRIST: CPT | Mod: 26,RT,, | Performed by: RADIOLOGY

## 2024-08-12 PROCEDURE — 20600 DRAIN/INJ JOINT/BURSA W/O US: CPT | Mod: LT,S$GLB,, | Performed by: ORTHOPAEDIC SURGERY

## 2024-08-12 PROCEDURE — 73130 X-RAY EXAM OF HAND: CPT | Mod: 26,LT,, | Performed by: RADIOLOGY

## 2024-08-12 PROCEDURE — 73130 X-RAY EXAM OF HAND: CPT | Mod: TC,PO,LT

## 2024-08-12 PROCEDURE — 3288F FALL RISK ASSESSMENT DOCD: CPT | Mod: CPTII,S$GLB,, | Performed by: ORTHOPAEDIC SURGERY

## 2024-08-12 PROCEDURE — 1101F PT FALLS ASSESS-DOCD LE1/YR: CPT | Mod: CPTII,S$GLB,, | Performed by: ORTHOPAEDIC SURGERY

## 2024-08-12 RX ORDER — TRIAMCINOLONE ACETONIDE 40 MG/ML
40 INJECTION, SUSPENSION INTRA-ARTICULAR; INTRAMUSCULAR
Status: DISCONTINUED | OUTPATIENT
Start: 2024-08-12 | End: 2024-08-12 | Stop reason: HOSPADM

## 2024-08-12 RX ADMIN — TRIAMCINOLONE ACETONIDE 40 MG: 40 INJECTION, SUSPENSION INTRA-ARTICULAR; INTRAMUSCULAR at 09:08

## 2024-08-12 NOTE — PROCEDURES
Small Joint Aspiration/Injection: L thumb CMC    Date/Time: 8/12/2024 9:20 AM    Performed by: Franky Ruth MD  Authorized by: Franky Ruth MD    Consent Done?:  Yes (Verbal)  Indications:  Pain  Site marked: the procedure site was marked    Timeout: prior to procedure the correct patient, procedure, and site was verified    Local anesthetic:  Topical anesthetic  Location:  Thumb  Site:  L thumb CMC (Left thumb CMC joint)  Ultrasonic guidance for needle placement?: No    Needle size:  25 G  Medications:  40 mg triamcinolone acetonide 40 mg/mL; 40 mg triamcinolone acetonide 40 mg/mL (20 mg injected)  Patient tolerance:  Patient tolerated the procedure well with no immediate complications

## 2024-08-12 NOTE — PROGRESS NOTES
8/12/2024    Chief Complaint:  Chief Complaint   Patient presents with    Left Hand - Pain     Thumb pain and Middle and ring trigger finger    Right Hand - Pain, Numbness       HPI:  Telma Fraser is a 78 y.o. female, who presents to clinic today has a history of pain to both of her hands and wrist.  She states that she was had surgical procedures on both sides.  On the right she has had a resection of her ulnar head.  On her left she had a wrist fracture and subsequently had a tendon transfer.  She continues to complain of pain to the base of the left thumb as well as the right forearm and wrist.  She was here today for further evaluation    PMHX:  Past Medical History:   Diagnosis Date    Achalasia of esophagus     Acquired hypogammaglobulinemia     Adrenal cortical hypofunction     Adrenal insufficiency 06/22/2016 6/22/16 Dr. Jean Nelson Currently Has Her On Prednisone 5 Mg Daily    Anemia due to chronic blood loss 12/13/2017    Anemia, chronic disease 12/13/2017    Anemia, chronic renal failure 12/13/2017    Anxiety     Asthma     Asthma without status asthmaticus     Atrial fibrillation     Chronic gastritis     mild    Closed fracture of left inferior pubic ramus 9/29/2018    COPD (chronic obstructive pulmonary disease)     Depression     Diplopia     Diseases of tricuspid valve     Dizziness 06/22/2016    Early satiety 06/22/2016    Fall 08/2019    Fall 12/08/2019    left wrist injury    Fall 11/2020    with injury    H/O: GI bleed 12/13/2017    History of colon polyps     Hyperlipemia     Hyperlipidemia     Hypertension     Hyposmolality and/or hyponatremia     Hypothyroidism     Insomnia, unspecified     Mitral valve disorders(424.0)     Nausea And Vomiting 06/22/2016    Nephrogenous proteinuria     Other abnormal glucose     Senile osteoporosis     SIADH (syndrome of inappropriate ADH production)     TIA (transient ischemic attack)     Vitamin D deficiency     Tor Parkinson White pattern seen  on electrocardiogram        PSHX:  Past Surgical History:   Procedure Laterality Date    analplasty      for hemorrhoids    APPENDECTOMY      BUNIONECTOMY Bilateral     CARDIAC ELECTROPHYSIOLOGY MAPPING AND ABLATION  2016    COLONOSCOPY N/A 2019    Procedure: COLONOSCOPY;  Surgeon: Don Medrano MD;  Location: [facility] ENDO;  Service: Endoscopy;  Laterality: N/A;    COLONOSCOPY N/A 2022    Procedure: COLONOSCOPY;  Surgeon: Don Medrano MD;  [facility];  Service: Endoscopy;  Laterality: N/A;    ESOPHAGEAL DILATION N/A 4/15/2024    Procedure: DILATION, ESOPHAGUS;  Surgeon: Don Medrano MD;  Location: [facility] ENDO;  Service: Endoscopy;  Laterality: N/A;    ESOPHAGOGASTRODUODENOSCOPY N/A 2022    Procedure: EGD (ESOPHAGOGASTRODUODENOSCOPY);  Surgeon: Don Medrano MD;  Location: [facility] ENDO;  Service: Endoscopy;  Laterality: N/A;    ESOPHAGOGASTRODUODENOSCOPY N/A 4/15/2024    Procedure: EGD (ESOPHAGOGASTRODUODENOSCOPY);  Surgeon: Don Medrano MD;  Location: [facility] ENDO;  Service: Endoscopy;  Laterality: N/A;    GASTROPARESIS      JOINT REPLACEMENT Right     KNEE ARTHROPLASTY Right     right TKA    KNEE ARTHROSCOPY Left     LEFT SCOPE 6/7/04 10/14/11    left thumb      left wrist      right knee      right wrist      SINUS SURGERY      SMALL BOWEL ENTEROSCOPY N/A 2023    Procedure: ENTEROSCOPY;  Surgeon: Tony Avlies MD;  Location: [facility] ENDO;  Service: Endoscopy;  Laterality: N/A;    TUBAL LIGATION         FMHX:  Family History   Problem Relation Name Age of Onset    Mental illness Mother      Pneumonia Father         SOCHX:  Social History     Tobacco Use    Smoking status: Former     Current packs/day: 0.00     Average packs/day: 0.3 packs/day for 5.0 years (1.3 ttl pk-yrs)     Types: Cigarettes     Start date:      Quit date:      Years since quittin.6    Smokeless tobacco: Never    Tobacco comments:     QUIT 50 YRS AGO   Substance Use  Topics    Alcohol use: No       ALLERGIES:  Bactrim [sulfamethoxazole-trimethoprim], Butalbital-aspirin-caffeine, Gabapentin, Pregabalin, Raloxifene, Sulfa (sulfonamide antibiotics), Ceftin [cefuroxime axetil], Doxycycline, and Rosuvastatin    CURRENT MEDICATIONS:  Current Outpatient Medications on File Prior to Visit   Medication Sig Dispense Refill    albuterol (PROVENTIL/VENTOLIN HFA) 90 mcg/actuation inhaler Inhale 2 puffs into the lungs every 6 (six) hours as needed for Wheezing or Shortness of Breath. 18 g 11    ALPRAZolam (XANAX) 0.5 MG tablet TAKE 1 TABLET TWICE A DAY BY ORAL ROUTE AS NEEDED FOR 30 DAYS.      apixaban (ELIQUIS) 2.5 mg Tab Take 2.5 mg by mouth 2 (two) times a day. Indications: Aftria fibrillation      atorvastatin (LIPITOR) 40 MG tablet Take 40 mg by mouth every evening.      biotin 1,000 mcg Chew Take 1 tablet by mouth once daily. Indications: deficiency of biotin a component of the vitamin B complex      calcium carbonate (OS-MALIK) 500 mg calcium (1,250 mg) tablet Take 2 tablets by mouth once daily.      cephALEXin (KEFLEX) 250 MG capsule Take 250 mg by mouth once daily. Indications: urinary tract infection      cetirizine (ZYRTEC) 10 MG tablet Take 10 mg by mouth nightly. Indications: inflammation of the nose due to an allergy      cholecalciferol, vitamin D3, (VITAMIN D3) 25 mcg (1,000 unit) capsule Take 1,000 Units by mouth once daily.      cyanocobalamin (VITAMIN B-12) 100 MCG tablet Take 100 mcg by mouth once daily.      denosumab (PROLIA) 60 mg/mL Syrg Inject 60 mg into the skin every 6 (six) months.      dextromethorphan HBr 10 mg/5 mL Liqd Take 20 mLs by mouth 4 (four) times daily as needed. Indications: cough      docusate sodium (COLACE) 100 MG capsule Take 100 mg by mouth 2 (two) times daily. taking 2 twice daily  Indications: constipation      estradioL (ESTRACE) 0.01 % (0.1 mg/gram) vaginal cream Place 1 g vaginally once daily.      famotidine (PEPCID) 20 MG tablet TAKE 1 TABLET  "TWICE DAILY 180 tablet 0    fentaNYL (DURAGESIC) 25 mcg/hr Place 1 patch onto the skin every 72 hours.  0    fludrocortisone (FLORINEF) 0.1 mg Tab Take 100 mcg by mouth.      glycopyrrolate (ROBINUL) 1 mg Tab Take 1 tablet (1 mg total) by mouth 3 (three) times daily. 90 tablet 2    ibuprofen (ADVIL LIQUI-GELS MINIS) 200 mg Cap Take 2 capsules by mouth 2 (two) times daily as needed. Indications: pain      IMMUN GLOB G,IGG,/PRO/IGA 0-50 (PRIVIGEN IV) Inject 25 mg into the vein every 30 days.      iron-vit c-vit b12-folic acid (IRON-C PLUS) tablet Take 1 tablet by mouth once daily.      levocetirizine (XYZAL) 5 MG tablet Take 5 mg by mouth.      LIDOcaine (LIDODERM) 5 % Place 1 patch onto the skin once daily. Remove & Discard patch within 12 hours or as directed by MD Shi patch 1    melatonin 12 mg Tab Take 12 mg by mouth nightly. pt stated "I take this every time I wake up during the night" Instructed pt only 1 x per night  Indications: insomnia      metoclopramide HCl (REGLAN) 10 MG tablet Take 1 tablet (10 mg total) by mouth 3 (three) times daily before meals. taking only as needed 270 tablet 2    metoprolol succinate (TOPROL-XL) 25 MG 24 hr tablet Take 12.5 mg by mouth once daily.      nystatin (MYCOSTATIN) 100,000 unit/mL suspension Take 6 mLs (600,000 Units total) by mouth 3 (three) times daily. Swish and swallow. 120 mL 2    pantoprazole (PROTONIX) 40 MG tablet Take 1 tablet (40 mg total) by mouth 2 (two) times daily. 60 tablet 11    potassium chloride (MICRO-K) 8 mEq CpSR Take 10 mEq by mouth 2 (two) times daily. Indications: low amount of potassium in the blood      predniSONE (DELTASONE) 5 MG tablet TAKE 1 TABLET ONE TIME DAILY 90 tablet 3    primidone (MYSOLINE) 250 MG Tab Take 250 mg by mouth nightly.      propafenone (RYTHMOL) 225 MG Tab Take 225 mg by mouth 2 (two) times daily.      temazepam (RESTORIL) 30 mg capsule Take 1 capsule (30 mg total) by mouth every evening. 30 capsule 1    temazepam (RESTORIL) " 30 mg capsule Take 1 capsule (30 mg total) by mouth every evening. 30 capsule 3    tiZANidine (ZANAFLEX) 4 MG tablet Take 1 tablet (4 mg total) by mouth every 8 (eight) hours as needed. 40 tablet 0    tolvaptan (SAMSCA) 15 mg Tab Take 15 mg by mouth Daily. take 1 tablet every day by mouth  Indications: low amount of sodium in the blood      traMADoL (ULTRAM) 50 mg tablet Take 50 mg by mouth 3 (three) times daily.      valACYclovir (VALTREX) 1000 MG tablet Take 2 by mouth at the onset of fever blisters and then repeat the dosage in 12 hours 30 tablet 0    zonisamide (ZONEGRAN) 100 MG Cap Take 200 mg by mouth every evening.      BENEFIBER, WHEAT DEXTRIN, ORAL Take 1 Dose by mouth daily as needed (constipation). Indications: constipation (Patient not taking: Reported on 7/16/2024)      lisinopriL (PRINIVIL,ZESTRIL) 2.5 MG tablet Take 2.5 mg by mouth once daily.      [DISCONTINUED] metOLazone (ZAROXOLYN) 5 MG tablet Take 5 mg by mouth as needed (weekly for rapid weight gain 2-3 # in 1-2 days or 5# in 1 week). ordered per Dr gisell Navas *cadiologist) stated pt dtr [place]  Indications: accumulation of fluid resulting from chronic heart failure       No current facility-administered medications on file prior to visit.       REVIEW OF SYSTEMS:  Review of Systems   Constitutional: Negative.    HENT: Negative.     Eyes: Negative.    Respiratory: Negative.     Cardiovascular: Negative.    Gastrointestinal: Negative.    Genitourinary: Negative.    Musculoskeletal:  Positive for falls and joint pain.   Skin: Negative.    Neurological:  Positive for tingling and weakness.   Endo/Heme/Allergies: Negative.    Psychiatric/Behavioral: Negative.       GENERAL PHYSICAL EXAM:   There were no vitals taken for this visit.   GEN: well developed, well nourished, no acute distress   HENT: Normocephalic, atraumatic   EYES: No discharge, conjunctiva normal   NECK: Supple, non-tender   PULM: No wheezing, no respiratory distress   CV:  RRR   ABD: Soft, non-tender    ORTHO EXAM:   Examination of the right wrist and hand reveals that there is no significant edema.  There are no major skin changes.  Has evidence of the ulnar head resection.  Palpation does produce tenderness at the intersection between the remaining portion of the ulna and the radius.  The remainder of the wrist does not have a significant amount of tenderness.  Wrist range of motion is limited with extension of 20° and flexion of 30°.  She does have sensation grossly intact.      Examination of the left hand and wrist reveals that there is no edema.  She does have significant prominence of the thumb CMC joint with a mild extension deformity of the PIP joint.  There are healed incisions over the wrist noted.  She does have sensation which is grossly intact and capillary refill is less than 2 seconds.  1 there is also noted to be subluxation of the extensor tendons at the MCP joint of the middle and the ring finger which are consistent with sagittal band disruption.    RADIOLOGY:   X-rays of both the right wrist and the left hand were taken in clinic today on the right wrist she does have evidence of the ulnar head resection.  There was some scalloping on the radius from abutment of the ulnar stump.  She also has degenerative changes throughout the wrist.  On the left she has evidence of severe thumb CMC arthritis.  There is evidence of a previous wrist fracture.  There are degenerative changes throughout.    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 After consent was obtained injection was placed to the left thumb CMC joint     2.  I did discuss treatment for the sagittal bands.  At this time she does not want to undergo any surgery     3.  I have also discussed fatty in a position of the right forearm.  We will hold off on all those surgical procedures     4.  She will  follow up with me as needed

## 2024-08-13 ENCOUNTER — PATIENT MESSAGE (OUTPATIENT)
Dept: OTOLARYNGOLOGY | Facility: CLINIC | Age: 79
End: 2024-08-13
Payer: MEDICARE

## 2024-08-14 DIAGNOSIS — K11.7 EXCESSIVE SALIVATION: Primary | ICD-10-CM

## 2024-08-15 DIAGNOSIS — K11.7 EXCESSIVE SALIVATION: Primary | ICD-10-CM

## 2024-08-27 ENCOUNTER — PROCEDURE VISIT (OUTPATIENT)
Dept: OTOLARYNGOLOGY | Facility: CLINIC | Age: 79
End: 2024-08-27
Payer: MEDICARE

## 2024-08-27 VITALS
SYSTOLIC BLOOD PRESSURE: 122 MMHG | WEIGHT: 100.5 LBS | DIASTOLIC BLOOD PRESSURE: 68 MMHG | BODY MASS INDEX: 17.81 KG/M2 | HEIGHT: 63 IN

## 2024-08-27 DIAGNOSIS — K11.7 SIALORRHEA: Primary | ICD-10-CM

## 2024-08-27 DIAGNOSIS — K11.7 SIALORRHEA: ICD-10-CM

## 2024-08-27 DIAGNOSIS — G24.4 IDIOPATHIC OROFACIAL DYSTONIA: ICD-10-CM

## 2024-08-27 DIAGNOSIS — K11.7 EXCESSIVE SALIVATION: Primary | ICD-10-CM

## 2024-08-27 PROCEDURE — 64611 CHEMODENERV SALIV GLANDS: CPT | Mod: S$GLB,,, | Performed by: STUDENT IN AN ORGANIZED HEALTH CARE EDUCATION/TRAINING PROGRAM

## 2024-08-27 NOTE — PROCEDURES
Procedure:   4 gland chemodenervation using ultrasound guidance    CPT 82209 med [id number]     Indications: sialorrhea, inability to titrate oral medications successfully, orofacial dystonia (tardive oral movements).    Findings: Botox injected into bilateral parotid glands and submandibular glands using ultrasound guidance. Botox 100 units mixed with 2.5 ml sterile saline. She tolerated well.   R Parotid: 20 units  L Parotid: 20 units  R Submandibular gland: 10 units  L Submandibular gland: 10 units  40 units of unavoidable waste    Will call to see how this is helping in 3-4 weeks then again in 2-3 months to see how long it lasts, if it does help. She will stop taking robinul in a few days.     Kirsty Raymond MD

## 2024-09-09 ENCOUNTER — PATIENT MESSAGE (OUTPATIENT)
Dept: OTOLARYNGOLOGY | Facility: CLINIC | Age: 79
End: 2024-09-09
Payer: MEDICARE

## 2024-09-09 RX ORDER — GLYCOPYRROLATE 1.5 MG/1
1.5 TABLET ORAL 3 TIMES DAILY
Qty: 90 TABLET | Refills: 0 | Status: SHIPPED | OUTPATIENT
Start: 2024-09-09 | End: 2024-09-10

## 2024-09-10 ENCOUNTER — TELEPHONE (OUTPATIENT)
Dept: OTOLARYNGOLOGY | Facility: CLINIC | Age: 79
End: 2024-09-10
Payer: MEDICARE

## 2024-09-10 PROBLEM — M17.12 PRIMARY OSTEOARTHRITIS OF LEFT KNEE: Status: ACTIVE | Noted: 2024-09-10

## 2024-09-10 PROBLEM — Z96.651 STATUS POST TOTAL RIGHT KNEE REPLACEMENT: Status: ACTIVE | Noted: 2024-09-10

## 2024-09-10 RX ORDER — FLUDROCORTISONE ACETATE 0.1 MG/1
100 TABLET ORAL
Qty: 30 TABLET | Refills: 3 | Status: SHIPPED | OUTPATIENT
Start: 2024-09-10

## 2024-09-10 RX ORDER — GLYCOPYRROLATE 1 MG/1
TABLET ORAL
Qty: 90 TABLET | Refills: 2 | Status: SHIPPED | OUTPATIENT
Start: 2024-09-10

## 2024-09-10 NOTE — TELEPHONE ENCOUNTER
Ok, the only option is to break the 1s in half and take 1.5 if she wants to try the half dose. Can you call and see if she thinks she can do this. She has 1s and 2s at home. They do make pill splitters as well.

## 2024-09-10 NOTE — TELEPHONE ENCOUNTER
----- Message from Kim Thacker sent at 9/10/2024  9:42 AM CDT -----  Contact: pharm  Type: Needs Medical Advice    Who Called: pharm    Best Call Back Number: [phone]    Additional Information: Requesting a call back regarding  pt rx for glycopyrrolate 1.5 mg Tab does not come in 1.5 and they are needing the office to call them with a update on how much mg needing to dispense and new instructions     Option: 1mg and 2mg only       [facility] - [address], [address] [facility]  [address] [facility] [address], [address] [address]  Phone: [phone] Fax: [phone]      Please Advise- Thank you

## 2024-11-06 ENCOUNTER — OFFICE VISIT (OUTPATIENT)
Dept: ENDOCRINOLOGY | Facility: CLINIC | Age: 79
End: 2024-11-06
Payer: MEDICARE

## 2024-11-06 VITALS
DIASTOLIC BLOOD PRESSURE: 60 MMHG | HEART RATE: 65 BPM | HEIGHT: 63 IN | OXYGEN SATURATION: 97 % | SYSTOLIC BLOOD PRESSURE: 104 MMHG | BODY MASS INDEX: 16.95 KG/M2 | WEIGHT: 95.69 LBS

## 2024-11-06 DIAGNOSIS — E78.00 HYPERCHOLESTEREMIA: Chronic | ICD-10-CM

## 2024-11-06 DIAGNOSIS — E27.40 ADRENAL INSUFFICIENCY: Primary | ICD-10-CM

## 2024-11-06 DIAGNOSIS — E22.2 SIADH (SYNDROME OF INAPPROPRIATE ADH PRODUCTION): ICD-10-CM

## 2024-11-06 DIAGNOSIS — M81.0 OSTEOPOROSIS, UNSPECIFIED OSTEOPOROSIS TYPE, UNSPECIFIED PATHOLOGICAL FRACTURE PRESENCE: ICD-10-CM

## 2024-11-06 PROCEDURE — 99214 OFFICE O/P EST MOD 30 MIN: CPT | Mod: S$GLB,,, | Performed by: INTERNAL MEDICINE

## 2024-11-06 PROCEDURE — 1159F MED LIST DOCD IN RCRD: CPT | Mod: CPTII,S$GLB,, | Performed by: INTERNAL MEDICINE

## 2024-11-06 PROCEDURE — 3074F SYST BP LT 130 MM HG: CPT | Mod: CPTII,S$GLB,, | Performed by: INTERNAL MEDICINE

## 2024-11-06 PROCEDURE — 1126F AMNT PAIN NOTED NONE PRSNT: CPT | Mod: CPTII,S$GLB,, | Performed by: INTERNAL MEDICINE

## 2024-11-06 PROCEDURE — 99999 PR PBB SHADOW E&M-EST. PATIENT-LVL V: CPT | Mod: PBBFAC,,, | Performed by: INTERNAL MEDICINE

## 2024-11-06 PROCEDURE — 1160F RVW MEDS BY RX/DR IN RCRD: CPT | Mod: CPTII,S$GLB,, | Performed by: INTERNAL MEDICINE

## 2024-11-06 PROCEDURE — 3078F DIAST BP <80 MM HG: CPT | Mod: CPTII,S$GLB,, | Performed by: INTERNAL MEDICINE

## 2024-11-06 PROCEDURE — 3288F FALL RISK ASSESSMENT DOCD: CPT | Mod: CPTII,S$GLB,, | Performed by: INTERNAL MEDICINE

## 2024-11-06 PROCEDURE — 1100F PTFALLS ASSESS-DOCD GE2>/YR: CPT | Mod: CPTII,S$GLB,, | Performed by: INTERNAL MEDICINE

## 2024-11-06 PROCEDURE — G2211 COMPLEX E/M VISIT ADD ON: HCPCS | Mod: S$GLB,,, | Performed by: INTERNAL MEDICINE

## 2024-11-06 NOTE — PATIENT INSTRUCTIONS
Osteoporosis Treatment    Regardless if you are on a prescription medication for osteoporosis or osteopenia, one should still do all of the following. All of these things combined help to reduce your fracture risk. The goal of all these treatments is to reduce your risk of fracture.     Take Calcium and Vitamin D- It is important to get a minimum amount of 1200 mg of calcium daily. That can be in the diet or in the form of a supplement. Also a minimum of 800 IU of Vitamin D should be taken a day. Taking a prescription medication does not take the place of taking Calcium plus vitamin D. Some trials show a reduced fracture risk with taking calcium and vitamin D. An example of calcium to take is calcium citrate (Citracal) which is over the counter.   Weight bearing exercise- this is extremely important to reduce fracture risk. Trials have shown that weight bearing exercise can reduce the risk of a hip fracture. It may also help with your bone density. At minimum one should do 30 minutes of weight bearing exercise 3 times a week. Yoga and [employer] can often also help with balance.   Fall Precautions- the 1st goal in preventing a fracture is not falling. Always wear good shoes and avoid heals. Make sure you check your house to make sure there is nothing that could be a fall risk (jese, cords). Make sure if you get up at night that there is some lighting. Be mindful with pets as they can be common reasons for a fall. We often times feel safe in our own home, but that is a common area that one can have a fracture. If you usually use a walker or a cane, make sure you use it in the home as well.     Bone Density- once a prescription medication is started, we check your bone density every 2 years. It usually does not need to be checked more than that as your bone changes very slowly. Always keep in mind the goal of therapy is to reduce your fracture risk and not normalize your bone density. Sometimes you may see a slight  improvement in your bone density with treatment or no change at all. That is ok. One of the main reasons to do a bone density is to make sure there is not a decrease in your bone density    Drug Holidays- this does not apply to Prolia. We only do drug holidays for Fosamax, Reclast, Actonel and Boniva. Stopping or delaying Prolia can cause a rebound loss of bone density and in rare cases a compression fracture.     Risks of Medications for Osteoporosis  Most patients tolerate these medications without any problems. The following do not include all the side effects of these medications  Rarely patients can develop pains with these medications. They usually will go away. However, if they are severe you should let us know immediately  Osteonecrosis of the Jaw (ONJ)- this is a rare complication of many bone medications. It is diagnosed by a dentist or oral surgeon. If you develop pain in your jaw let us know and we have you see your dentist for an evaluation. Most cases are in patients with cancer who get much larger doses of these medications. The overall risk is 1 in 10,000 to 1 in 100,000 patient years. It is always important to get regular dental cleanings. If you are planning an invasive dental procedure we may ask that you complete that prior to starting treatment.   Atypical Femur Fractures- This is also a rare side effect of these medications. The risk increases the longer you are on these medications. This is one reason we sometimes do drug holidays. This can usually present with thigh or groin pain. The overall risk is 3.2 to 50 cases per 100,000 patient years

## 2024-11-06 NOTE — PROGRESS NOTES
CHIEF COMPLAINT: Adrenal Insufficiency/ Osteoporosis/ SIADH/ Multinodular Goiter.   79 y.o.   being seen as a f/u. She had a benign FNA 3/21/13. 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 She is seeing nephrology. Started on a trial of hydrocortisone for possible adrenal insufficiency (could not R/O based on cosyntropin stim test and having fatigue). On prednisone 5 mg currently. Had a fracture of the pelvis Sept 2018 after a fall when walking to the bathroom.       Prolia scheduled next week. Tolerating Prolia. States that tripped and fell in June 2024 and had a pubic ramus fracture. In July 2024 she leaned over the bed rail and had rib fractures. Taking Ca + D. Using a walker. No history of XRT. No history of cancer. No N/V. No CP. SOB. Has medic alert bracelet but not wearing it today.                 PAST MEDICAL HISTORY: Asthma, WPW, depression/anxiety, hyperlipidemia.      PAST SURGICAL HISTORY: right knee replacement. Hand surgery.      SOCIAL HISTORY: No T/A      FAMILY HISTORY: No known thyroid disease or thyroid cancer. Dementia.      MEDICATIONS/ALLERGIES: The patient's MedCard has been updated and reviewed.         PE:   GENERAL: Well developed, well nourished.   NECK: Supple, trachea midline, Left nodule palpable. No LAD   CHEST: Resp even and unlabored, CTA bilateral.   CARDIAC: RRR, S1, S2 heard, no murmurs, rubs, S3, or S4         Latest Reference Range & Units 10/31/24 07:11   Sodium 136 - 145 mmol/L 137   Potassium 3.5 - 5.1 mmol/L 3.6   Chloride 95 - 110 mmol/L 102   CO2 22 - 31 mmol/L 25   Anion Gap 5 - 12 mmol/L 10   BUN 7 - 18 mg/dL 21 (H)   Creatinine 0.50 - 1.40 mg/dL 0.95   eGFR >60 mL/min/1.73 m^2 >60   Glucose 70 - 110 mg/dL 98   Calcium 8.4 - 10.2 mg/dL 9.8   Phosphorus Level 2.7 - 4.5 mg/dL 4.2   Albumin 3.5 - 5.2 g/dL 4.5   (H): Data is abnormally high      Latest Reference Range & Units 09/05/24 11:09   PTH 9.0 - 77.0 pg/mL 35.4      Latest Reference Range & Units 05/05/23 08:31    Vitamin D 30 - 96 ng/mL 76       ASSESSMENT/PLAN:   1. Adrenal Insuffiency- Labs borderline for AI. Since having some symptoms that could be AI related decided to do a trial of steroids. Repeat testing could not rule out AI and symptoms improved with steroids, so continued steroids. Initially on Hydrocortisone, but could not tolerate. On prednisone and tolerating well. Discussed sick day precautions.  Discussed wearing a medical alert bracelet    2. Multinodular goiter-S/P benign FNA of left nodule.        3. SIADH- . Being followed by nephrology.  On tolvaptan.  Sodium stable     4. Osteoporosis- has had a pelvis fracture as well as a rib fracture. On Prolia.  Discussed that Prolia ideally should not be stopped or delayed as there is risk of rebound bone loss and compression fractures.  There is some data for addition of an anabolic agent such as Forteo to her Prolia.  She does not want to do that at this time as she does not want to do daily injections..  Discussed benefits.  Gave information in AVS including risks of the medication.  I would also like to repeat some of her secondary workup.     FOLLOWUP  24 hr urine Ca/Cr, Vit D, SPEP, lipids, fasting CTX  F/U 6 months with Renal Panel prior to next prolia.

## 2024-11-13 ENCOUNTER — INFUSION (OUTPATIENT)
Dept: INFUSION THERAPY | Facility: HOSPITAL | Age: 79
End: 2024-11-13
Attending: INTERNAL MEDICINE
Payer: MEDICARE

## 2024-11-13 VITALS
RESPIRATION RATE: 16 BRPM | OXYGEN SATURATION: 100 % | TEMPERATURE: 98 F | DIASTOLIC BLOOD PRESSURE: 68 MMHG | SYSTOLIC BLOOD PRESSURE: 125 MMHG | HEART RATE: 61 BPM

## 2024-11-13 DIAGNOSIS — S32.592A CLOSED FRACTURE OF LEFT INFERIOR PUBIC RAMUS, INITIAL ENCOUNTER: Primary | ICD-10-CM

## 2024-11-13 PROCEDURE — 96372 THER/PROPH/DIAG INJ SC/IM: CPT | Mod: PN

## 2024-11-13 PROCEDURE — 63600175 PHARM REV CODE 636 W HCPCS: Mod: JZ,JG,PN | Performed by: INTERNAL MEDICINE

## 2024-11-13 RX ADMIN — DENOSUMAB 60 MG: 60 INJECTION SUBCUTANEOUS at 11:11

## 2024-11-20 ENCOUNTER — LAB VISIT (OUTPATIENT)
Dept: LAB | Facility: HOSPITAL | Age: 79
End: 2024-11-20
Attending: INTERNAL MEDICINE
Payer: MEDICARE

## 2024-11-20 DIAGNOSIS — E78.00 HYPERCHOLESTEREMIA: Chronic | ICD-10-CM

## 2024-11-20 DIAGNOSIS — E27.40 ADRENAL INSUFFICIENCY: ICD-10-CM

## 2024-11-20 DIAGNOSIS — M81.0 OSTEOPOROSIS, UNSPECIFIED OSTEOPOROSIS TYPE, UNSPECIFIED PATHOLOGICAL FRACTURE PRESENCE: ICD-10-CM

## 2024-11-20 DIAGNOSIS — E22.2 SIADH (SYNDROME OF INAPPROPRIATE ADH PRODUCTION): ICD-10-CM

## 2024-11-20 LAB
25(OH)D3+25(OH)D2 SERPL-MCNC: 86 NG/ML (ref 30–96)
CALCIUM 24H UR-MRATE: 9 MG/HR (ref 4–12)
CALCIUM UR-MCNC: 6.6 MG/DL (ref 0–15)
CALCIUM URINE (MG/SPEC): 205 MG/SPEC
CHOLEST SERPL-MCNC: 193 MG/DL (ref 120–199)
CHOLEST/HDLC SERPL: 3 {RATIO} (ref 2–5)
CREAT 24H UR-MRATE: 22 MG/HR (ref 40–75)
CREAT UR-MCNC: 17 MG/DL (ref 15–325)
CREATININE, URINE (MG/SPEC): 527 MG/SPEC
HDLC SERPL-MCNC: 64 MG/DL (ref 40–75)
HDLC SERPL: 33.2 % (ref 20–50)
LDLC SERPL CALC-MCNC: 117.4 MG/DL (ref 63–159)
NONHDLC SERPL-MCNC: 129 MG/DL
TRIGL SERPL-MCNC: 58 MG/DL (ref 30–150)
URINE COLLECTION DURATION: 24 HR
URINE COLLECTION DURATION: 24 HR
URINE VOLUME: 3100 ML
URINE VOLUME: 3100 ML

## 2024-11-20 PROCEDURE — 82523 COLLAGEN CROSSLINKS: CPT | Performed by: INTERNAL MEDICINE

## 2024-11-20 PROCEDURE — 36415 COLL VENOUS BLD VENIPUNCTURE: CPT | Mod: PO | Performed by: INTERNAL MEDICINE

## 2024-11-20 PROCEDURE — 82570 ASSAY OF URINE CREATININE: CPT | Performed by: INTERNAL MEDICINE

## 2024-11-20 PROCEDURE — 82340 ASSAY OF CALCIUM IN URINE: CPT | Performed by: INTERNAL MEDICINE

## 2024-11-20 PROCEDURE — 82306 VITAMIN D 25 HYDROXY: CPT | Performed by: INTERNAL MEDICINE

## 2024-11-20 PROCEDURE — 84165 PROTEIN E-PHORESIS SERUM: CPT | Mod: 26,,, | Performed by: PATHOLOGY

## 2024-11-20 PROCEDURE — 84165 PROTEIN E-PHORESIS SERUM: CPT | Performed by: INTERNAL MEDICINE

## 2024-11-20 PROCEDURE — 80061 LIPID PANEL: CPT | Performed by: INTERNAL MEDICINE

## 2024-11-21 LAB
ALBUMIN SERPL ELPH-MCNC: 3.75 G/DL (ref 3.35–5.55)
ALPHA1 GLOB SERPL ELPH-MCNC: 0.31 G/DL (ref 0.17–0.41)
ALPHA2 GLOB SERPL ELPH-MCNC: 0.67 G/DL (ref 0.43–0.99)
B-GLOBULIN SERPL ELPH-MCNC: 0.73 G/DL (ref 0.5–1.1)
GAMMA GLOB SERPL ELPH-MCNC: 1.34 G/DL (ref 0.67–1.58)
PATHOLOGIST INTERPRETATION SPE: NORMAL
PROT SERPL-MCNC: 6.8 G/DL (ref 6–8.4)

## 2024-11-22 LAB — COLLAGEN CTX SERPL-MCNC: 136 PG/ML

## 2024-12-05 ENCOUNTER — TELEPHONE (OUTPATIENT)
Dept: ENDOCRINOLOGY | Facility: CLINIC | Age: 79
End: 2024-12-05
Payer: MEDICARE

## 2024-12-17 RX ORDER — FLUDROCORTISONE ACETATE 0.1 MG/1
100 TABLET ORAL
Qty: 30 TABLET | Refills: 11 | Status: SHIPPED | OUTPATIENT
Start: 2024-12-17

## 2025-02-02 ENCOUNTER — TELEPHONE (OUTPATIENT)
Dept: ENDOCRINOLOGY | Facility: CLINIC | Age: 80
End: 2025-02-02
Payer: MEDICARE

## 2025-02-02 DIAGNOSIS — E22.2 SIADH (SYNDROME OF INAPPROPRIATE ADH PRODUCTION): Primary | ICD-10-CM

## 2025-02-11 ENCOUNTER — PATIENT MESSAGE (OUTPATIENT)
Dept: ENDOCRINOLOGY | Facility: CLINIC | Age: 80
End: 2025-02-11
Payer: MEDICARE

## 2025-04-08 DIAGNOSIS — E27.40 ADRENAL INSUFFICIENCY: ICD-10-CM

## 2025-04-08 RX ORDER — PREDNISONE 5 MG/1
5 TABLET ORAL DAILY
Qty: 90 TABLET | Refills: 3 | Status: SHIPPED | OUTPATIENT
Start: 2025-04-08

## 2025-04-08 NOTE — TELEPHONE ENCOUNTER
----- Message from Dory sent at 4/8/2025 10:46 AM CDT -----  Contact: pt  Type:  RX Refill RequestWho Called:  ptRefill or New Rx: refill RX Name and Strength: predniSONE (DELTASONE) 5 MG tabletHow is the patient currently taking it? (ex. 1XDay):as directedIs this a 30 day or 90 day RX: 90Preferred Pharmacy with phone number: [facility] - [facility] [address] [place] [id number]: [phone] Fax: [phone] or Mail Order: mail orderOrdering Provider: Omar Would the patient rather a call back or a response via [facility]? Call after sending Best Call Back Number: [phone] Information:  please refill scriptThanks

## 2025-04-30 ENCOUNTER — LAB VISIT (OUTPATIENT)
Dept: LAB | Facility: HOSPITAL | Age: 80
End: 2025-04-30
Attending: INTERNAL MEDICINE
Payer: MEDICARE

## 2025-04-30 DIAGNOSIS — M81.0 OSTEOPOROSIS, UNSPECIFIED OSTEOPOROSIS TYPE, UNSPECIFIED PATHOLOGICAL FRACTURE PRESENCE: ICD-10-CM

## 2025-04-30 LAB
ALBUMIN SERPL BCP-MCNC: 3 G/DL (ref 3.5–5.2)
ANION GAP (OHS): 7 MMOL/L (ref 8–16)
BUN SERPL-MCNC: 14 MG/DL (ref 8–23)
CALCIUM SERPL-MCNC: 8.7 MG/DL (ref 8.7–10.5)
CHLORIDE SERPL-SCNC: 110 MMOL/L (ref 95–110)
CO2 SERPL-SCNC: 24 MMOL/L (ref 23–29)
CREAT SERPL-MCNC: 0.9 MG/DL (ref 0.5–1.4)
GFR SERPLBLD CREATININE-BSD FMLA CKD-EPI: >60 ML/MIN/1.73/M2
GLUCOSE SERPL-MCNC: 104 MG/DL (ref 70–110)
PHOSPHATE SERPL-MCNC: 3.7 MG/DL (ref 2.7–4.5)
POTASSIUM SERPL-SCNC: 4 MMOL/L (ref 3.5–5.1)
SODIUM SERPL-SCNC: 141 MMOL/L (ref 136–145)

## 2025-04-30 PROCEDURE — 80069 RENAL FUNCTION PANEL: CPT

## 2025-04-30 PROCEDURE — 36415 COLL VENOUS BLD VENIPUNCTURE: CPT | Mod: PO

## 2025-05-07 ENCOUNTER — OFFICE VISIT (OUTPATIENT)
Dept: ENDOCRINOLOGY | Facility: CLINIC | Age: 80
End: 2025-05-07
Payer: MEDICARE

## 2025-05-07 VITALS
DIASTOLIC BLOOD PRESSURE: 60 MMHG | BODY MASS INDEX: 19.67 KG/M2 | WEIGHT: 111 LBS | HEIGHT: 63 IN | HEART RATE: 64 BPM | OXYGEN SATURATION: 95 % | SYSTOLIC BLOOD PRESSURE: 124 MMHG

## 2025-05-07 DIAGNOSIS — E27.40 ADRENAL INSUFFICIENCY: ICD-10-CM

## 2025-05-07 DIAGNOSIS — M81.8 OTHER OSTEOPOROSIS WITHOUT CURRENT PATHOLOGICAL FRACTURE: Primary | ICD-10-CM

## 2025-05-07 PROCEDURE — 1160F RVW MEDS BY RX/DR IN RCRD: CPT | Mod: CPTII,S$GLB,, | Performed by: INTERNAL MEDICINE

## 2025-05-07 PROCEDURE — 99214 OFFICE O/P EST MOD 30 MIN: CPT | Mod: S$GLB,,, | Performed by: INTERNAL MEDICINE

## 2025-05-07 PROCEDURE — G2211 COMPLEX E/M VISIT ADD ON: HCPCS | Mod: S$GLB,,, | Performed by: INTERNAL MEDICINE

## 2025-05-07 PROCEDURE — 1101F PT FALLS ASSESS-DOCD LE1/YR: CPT | Mod: CPTII,S$GLB,, | Performed by: INTERNAL MEDICINE

## 2025-05-07 PROCEDURE — 3288F FALL RISK ASSESSMENT DOCD: CPT | Mod: CPTII,S$GLB,, | Performed by: INTERNAL MEDICINE

## 2025-05-07 PROCEDURE — 1159F MED LIST DOCD IN RCRD: CPT | Mod: CPTII,S$GLB,, | Performed by: INTERNAL MEDICINE

## 2025-05-07 PROCEDURE — 3078F DIAST BP <80 MM HG: CPT | Mod: CPTII,S$GLB,, | Performed by: INTERNAL MEDICINE

## 2025-05-07 PROCEDURE — 1126F AMNT PAIN NOTED NONE PRSNT: CPT | Mod: CPTII,S$GLB,, | Performed by: INTERNAL MEDICINE

## 2025-05-07 PROCEDURE — 99999 PR PBB SHADOW E&M-EST. PATIENT-LVL V: CPT | Mod: PBBFAC,,, | Performed by: INTERNAL MEDICINE

## 2025-05-07 PROCEDURE — 3074F SYST BP LT 130 MM HG: CPT | Mod: CPTII,S$GLB,, | Performed by: INTERNAL MEDICINE

## 2025-05-07 NOTE — PROGRESS NOTES
CHIEF COMPLAINT: Adrenal Insufficiency/ Osteoporosis/ SIADH/ Multinodular Goiter.   79 y.o.   being seen as a f/u. She had a benign FNA 3/21/13. 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 She is seeing nephrology. Started on a trial of hydrocortisone for possible adrenal insufficiency (could not R/O based on cosyntropin stim test and having fatigue). On prednisone 5 mg currently. On florinef. Had a fracture of the pelvis Sept 2018 after a fall when walking to the bathroom.   Asking for 1 time refill of iron.       Prolia scheduled next week. Tolerating Prolia. States that tripped and fell in June 2024 and had a pubic ramus fracture. In July 2024 she leaned over the bed rail and had rib fractures. No new fractures. Taking Ca + D. Using a walker. No history of XRT. No history of cancer. No N/V. No CP. SOB. Has medic alert bracelet and wearing today. No sick day dosing. Weight increased.                 PAST MEDICAL HISTORY: Asthma, WPW, depression/anxiety, hyperlipidemia.      PAST SURGICAL HISTORY: right knee replacement. Hand surgery.      SOCIAL HISTORY: No T/A      FAMILY HISTORY: No known thyroid disease or thyroid cancer. Dementia.      MEDICATIONS/ALLERGIES: The patient's MedCard has been updated and reviewed.         PE:   GENERAL: Well developed, well nourished.   NECK: Supple, trachea midline, Left nodule palpable. No LAD   CHEST: Resp even and unlabored, CTA bilateral.   CARDIAC: RRR, S1, S2 heard, no murmurs, rubs, S3, or S4       Latest Reference Range & Units 04/30/25 10:40   Sodium 136 - 145 mmol/L 141   Potassium 3.5 - 5.1 mmol/L 4.0   Chloride 95 - 110 mmol/L 110   CO2 23 - 29 mmol/L 24   Anion Gap 8 - 16 mmol/L 7 (L)   BUN 8 - 23 mg/dL 14   Creatinine 0.5 - 1.4 mg/dL 0.9   eGFR >60 mL/min/1.73/m2 >60   Glucose 70 - 110 mg/dL 104   Calcium 8.7 - 10.5 mg/dL 8.7   Phosphorus Level 2.7 - 4.5 mg/dL 3.7   Albumin 3.5 - 5.2 g/dL 3.0 (L)   (L): Data is abnormally low    Corrected Ca  9.5      ASSESSMENT/PLAN:   1. Adrenal Insuffiency- Labs borderline for AI. Since having some symptoms that could be AI related decided to do a trial of steroids. Repeat testing could not rule out AI and symptoms improved with steroids, so continued steroids. Initially on Hydrocortisone, but could not tolerate. On prednisone and tolerating well.  Weight backup, therefore we will hold off on increasing steroids.  Discussed sick day precautions.  Discussed wearing a medical alert bracelet    2. Multinodular goiter-S/P benign FNA of left nodule.  No obstructive symptoms       3. SIADH- . Being followed by nephrology.  On tolvaptan.  Sodium stable     4. Osteoporosis- has had a pelvis fracture as well as a rib fracture. On Prolia.  Discussed that Prolia ideally should not be stopped or delayed as there is risk of rebound bone loss and compression fractures.  There is some data for addition of an anabolic agent such as Forteo to her Prolia.  She does not want to do that at this time as she does not want to do daily injections..  Discussed benefits.  Continue Prolia.  Check bone density at follow up.  Therapy plan placed.      FOLLOWUP  F/U 6 months with Renal Panel, DXA prior to next prolia.

## 2025-05-12 ENCOUNTER — INFUSION (OUTPATIENT)
Dept: INFUSION THERAPY | Facility: HOSPITAL | Age: 80
End: 2025-05-12
Attending: INTERNAL MEDICINE
Payer: MEDICARE

## 2025-05-12 VITALS
TEMPERATURE: 98 F | OXYGEN SATURATION: 99 % | RESPIRATION RATE: 18 BRPM | DIASTOLIC BLOOD PRESSURE: 67 MMHG | SYSTOLIC BLOOD PRESSURE: 128 MMHG | HEART RATE: 61 BPM

## 2025-05-12 DIAGNOSIS — S32.592A CLOSED FRACTURE OF LEFT INFERIOR PUBIC RAMUS, INITIAL ENCOUNTER: Primary | ICD-10-CM

## 2025-05-12 DIAGNOSIS — M81.8 OTHER OSTEOPOROSIS WITHOUT CURRENT PATHOLOGICAL FRACTURE: ICD-10-CM

## 2025-05-12 PROCEDURE — 63600175 PHARM REV CODE 636 W HCPCS: Mod: JZ,TB,PN | Performed by: INTERNAL MEDICINE

## 2025-05-12 PROCEDURE — 96372 THER/PROPH/DIAG INJ SC/IM: CPT | Mod: PN

## 2025-05-12 RX ADMIN — DENOSUMAB 60 MG: 60 INJECTION SUBCUTANEOUS at 11:05

## 2025-06-26 ENCOUNTER — TELEPHONE (OUTPATIENT)
Dept: ENDOCRINOLOGY | Facility: CLINIC | Age: 80
End: 2025-06-26
Payer: MEDICARE

## 2025-06-26 NOTE — TELEPHONE ENCOUNTER
Copied from CRM #[id number]. Topic: General Inquiry - Patient Advice  >> Jun 26, 2025  8:02 TONIA Castellanos wrote:  Type:  Needs Medical Advice    Who Called: Nafisa ladd/ Dr. Prosper JAY  Would the patient rather a call back or a response via [facility]? Call  Best Call Back Number: [phone]   Additional Information:  Calling for medical clearance for tooth extraction. Pls call back and adv. Thank you

## 2025-06-26 NOTE — TELEPHONE ENCOUNTER
Pt advised her dental clearance form was sent back to her dentist's office yesterday. Pt verb understanding.

## 2025-06-26 NOTE — TELEPHONE ENCOUNTER
----- Message from IAN Dalton sent at 6/25/2025  4:41 PM CDT -----  Contact: Patient    ----- Message -----  From: Brii Rodriguez  Sent: 6/25/2025   4:26 PM CDT  To: Omar Pena Staff (Endo)    Type:  Needs Medical Advice    Who Called: Patient      Would the patient rather a call back or a response via [facility]? Call    Best Call Back Number: [phone]    Additional Information: Patient is requesting a call back from Jaye regarding an injection